# Patient Record
Sex: MALE | Race: BLACK OR AFRICAN AMERICAN | NOT HISPANIC OR LATINO | Employment: UNEMPLOYED | RURAL
[De-identification: names, ages, dates, MRNs, and addresses within clinical notes are randomized per-mention and may not be internally consistent; named-entity substitution may affect disease eponyms.]

---

## 2021-07-11 ENCOUNTER — OFFICE VISIT (OUTPATIENT)
Dept: FAMILY MEDICINE | Facility: CLINIC | Age: 31
End: 2021-07-11
Payer: MEDICARE

## 2021-07-11 VITALS
SYSTOLIC BLOOD PRESSURE: 179 MMHG | TEMPERATURE: 98 F | HEIGHT: 67 IN | BODY MASS INDEX: 40.81 KG/M2 | OXYGEN SATURATION: 98 % | DIASTOLIC BLOOD PRESSURE: 114 MMHG | RESPIRATION RATE: 18 BRPM | WEIGHT: 260 LBS | HEART RATE: 73 BPM

## 2021-07-11 DIAGNOSIS — R32 ENURESIS: ICD-10-CM

## 2021-07-11 DIAGNOSIS — R35.0 FREQUENT URINATION: ICD-10-CM

## 2021-07-11 DIAGNOSIS — N30.00 ACUTE CYSTITIS WITHOUT HEMATURIA: Primary | ICD-10-CM

## 2021-07-11 LAB
BILIRUB SERPL-MCNC: ABNORMAL MG/DL
BLOOD URINE, POC: ABNORMAL
COLOR, POC UA: YELLOW
GLUCOSE UR QL STRIP: ABNORMAL
KETONES UR QL STRIP: ABNORMAL
LEUKOCYTE ESTERASE URINE, POC: ABNORMAL
NITRITE, POC UA: ABNORMAL
PH, POC UA: 7
PROTEIN, POC: ABNORMAL
SPECIFIC GRAVITY, POC UA: 1.01
UROBILINOGEN, POC UA: 0.2

## 2021-07-11 PROCEDURE — 99051 MED SERV EVE/WKEND/HOLIDAY: CPT | Mod: ,,, | Performed by: FAMILY MEDICINE

## 2021-07-11 PROCEDURE — 99051 PR MEDICAL SERVICES, EVE/WKEND/HOLIDAY: ICD-10-PCS | Mod: ,,, | Performed by: FAMILY MEDICINE

## 2021-07-11 PROCEDURE — 99214 OFFICE O/P EST MOD 30 MIN: CPT | Mod: ,,, | Performed by: FAMILY MEDICINE

## 2021-07-11 PROCEDURE — 81003 URINALYSIS AUTO W/O SCOPE: CPT | Mod: RHCUB | Performed by: FAMILY MEDICINE

## 2021-07-11 PROCEDURE — 99214 PR OFFICE/OUTPT VISIT, EST, LEVL IV, 30-39 MIN: ICD-10-PCS | Mod: ,,, | Performed by: FAMILY MEDICINE

## 2021-07-11 RX ORDER — IRON,FM,PS/FOLIC/B,C18/L.CASEI 130-1.25MG
CAPSULE ORAL DAILY
COMMUNITY
End: 2021-12-01 | Stop reason: SDUPTHER

## 2021-07-11 RX ORDER — DIPHENHYDRAMINE HCL 25 MG
25 TABLET ORAL NIGHTLY PRN
COMMUNITY
End: 2023-09-20

## 2021-07-11 RX ORDER — CIPROFLOXACIN 500 MG/1
500 TABLET ORAL EVERY 12 HOURS
Qty: 14 TABLET | Refills: 0 | Status: SHIPPED | OUTPATIENT
Start: 2021-07-11 | End: 2021-07-18

## 2021-07-11 RX ORDER — PAROXETINE HYDROCHLORIDE 40 MG/1
40 TABLET, FILM COATED ORAL EVERY MORNING
COMMUNITY
End: 2022-06-02 | Stop reason: SDUPTHER

## 2021-07-11 RX ORDER — POTASSIUM CHLORIDE 20 MEQ/15ML
SOLUTION ORAL DAILY
COMMUNITY
End: 2021-12-01 | Stop reason: SDUPTHER

## 2021-07-11 RX ORDER — PANTOPRAZOLE SODIUM 40 MG/1
40 TABLET, DELAYED RELEASE ORAL DAILY
COMMUNITY
End: 2022-03-16 | Stop reason: SDUPTHER

## 2021-07-11 RX ORDER — LOSARTAN POTASSIUM 100 MG/1
100 TABLET ORAL DAILY
COMMUNITY
End: 2021-09-01 | Stop reason: HOSPADM

## 2021-07-11 RX ORDER — HYDROCHLOROTHIAZIDE 25 MG/1
25 TABLET ORAL DAILY
COMMUNITY
End: 2021-09-01

## 2021-07-11 RX ORDER — RISPERIDONE 3 MG/1
3 TABLET ORAL 2 TIMES DAILY
COMMUNITY
End: 2022-06-02 | Stop reason: SDUPTHER

## 2021-07-11 RX ORDER — METOPROLOL SUCCINATE 50 MG/1
50 TABLET, EXTENDED RELEASE ORAL DAILY
COMMUNITY
End: 2022-03-16 | Stop reason: SDUPTHER

## 2021-07-11 RX ORDER — ATORVASTATIN CALCIUM 10 MG/1
10 TABLET, FILM COATED ORAL DAILY
COMMUNITY
End: 2022-03-16 | Stop reason: SDUPTHER

## 2021-07-11 RX ORDER — METFORMIN HYDROCHLORIDE 500 MG/1
500 TABLET ORAL
COMMUNITY
End: 2022-03-16 | Stop reason: SDUPTHER

## 2021-07-22 ENCOUNTER — OFFICE VISIT (OUTPATIENT)
Dept: FAMILY MEDICINE | Facility: CLINIC | Age: 31
End: 2021-07-22
Payer: MEDICARE

## 2021-07-22 ENCOUNTER — APPOINTMENT (OUTPATIENT)
Dept: RADIOLOGY | Facility: CLINIC | Age: 31
End: 2021-07-22
Attending: NURSE PRACTITIONER
Payer: MEDICARE

## 2021-07-22 VITALS
WEIGHT: 270 LBS | HEIGHT: 67 IN | RESPIRATION RATE: 16 BRPM | SYSTOLIC BLOOD PRESSURE: 157 MMHG | HEART RATE: 105 BPM | BODY MASS INDEX: 42.38 KG/M2 | DIASTOLIC BLOOD PRESSURE: 100 MMHG

## 2021-07-22 DIAGNOSIS — M25.562 PAIN IN JOINT OF LEFT KNEE: ICD-10-CM

## 2021-07-22 DIAGNOSIS — M25.562 PAIN IN JOINT OF LEFT KNEE: Primary | ICD-10-CM

## 2021-07-22 PROCEDURE — 99213 PR OFFICE/OUTPT VISIT, EST, LEVL III, 20-29 MIN: ICD-10-PCS | Mod: ,,, | Performed by: NURSE PRACTITIONER

## 2021-07-22 PROCEDURE — 73560 XR KNEE 1 OR 2 VIEW LEFT: ICD-10-PCS | Mod: 26,LT,, | Performed by: RADIOLOGY

## 2021-07-22 PROCEDURE — 73560 X-RAY EXAM OF KNEE 1 OR 2: CPT | Mod: 26,LT,, | Performed by: RADIOLOGY

## 2021-07-22 PROCEDURE — 73560 X-RAY EXAM OF KNEE 1 OR 2: CPT | Mod: TC,RHCUB,FY,LT | Performed by: NURSE PRACTITIONER

## 2021-07-22 PROCEDURE — 99213 OFFICE O/P EST LOW 20 MIN: CPT | Mod: ,,, | Performed by: NURSE PRACTITIONER

## 2021-09-01 ENCOUNTER — OFFICE VISIT (OUTPATIENT)
Dept: FAMILY MEDICINE | Facility: CLINIC | Age: 31
End: 2021-09-01
Payer: MEDICARE

## 2021-09-01 VITALS
SYSTOLIC BLOOD PRESSURE: 144 MMHG | TEMPERATURE: 96 F | BODY MASS INDEX: 40.77 KG/M2 | DIASTOLIC BLOOD PRESSURE: 94 MMHG | HEART RATE: 71 BPM | RESPIRATION RATE: 19 BRPM | HEIGHT: 68 IN | WEIGHT: 269 LBS

## 2021-09-01 DIAGNOSIS — R73.9 HYPERGLYCEMIA: ICD-10-CM

## 2021-09-01 DIAGNOSIS — E78.49 OTHER HYPERLIPIDEMIA: ICD-10-CM

## 2021-09-01 DIAGNOSIS — D64.9 ANEMIA, UNSPECIFIED TYPE: ICD-10-CM

## 2021-09-01 DIAGNOSIS — I10 ESSENTIAL HYPERTENSION: Primary | ICD-10-CM

## 2021-09-01 DIAGNOSIS — M10.071 ACUTE IDIOPATHIC GOUT OF RIGHT FOOT: ICD-10-CM

## 2021-09-01 LAB
ALBUMIN SERPL BCP-MCNC: 3.7 G/DL (ref 3.5–5)
ALBUMIN/GLOB SERPL: 0.8 {RATIO}
ALP SERPL-CCNC: 96 U/L (ref 45–115)
ALT SERPL W P-5'-P-CCNC: 46 U/L (ref 16–61)
ANION GAP SERPL CALCULATED.3IONS-SCNC: 8 MMOL/L (ref 7–16)
AST SERPL W P-5'-P-CCNC: 35 U/L (ref 15–37)
BASOPHILS # BLD AUTO: 0.03 K/UL (ref 0–0.2)
BASOPHILS NFR BLD AUTO: 0.4 % (ref 0–1)
BILIRUB SERPL-MCNC: 0.4 MG/DL (ref 0–1.2)
BUN SERPL-MCNC: 13 MG/DL (ref 7–18)
BUN/CREAT SERPL: 11 (ref 6–20)
CALCIUM SERPL-MCNC: 9.6 MG/DL (ref 8.5–10.1)
CHLORIDE SERPL-SCNC: 98 MMOL/L (ref 98–107)
CHOLEST SERPL-MCNC: 130 MG/DL (ref 0–200)
CHOLEST/HDLC SERPL: 3 {RATIO}
CO2 SERPL-SCNC: 34 MMOL/L (ref 21–32)
CREAT SERPL-MCNC: 1.17 MG/DL (ref 0.7–1.3)
CREAT UR-MCNC: 107 MG/DL (ref 39–259)
DIFFERENTIAL METHOD BLD: ABNORMAL
EOSINOPHIL # BLD AUTO: 0.29 K/UL (ref 0–0.5)
EOSINOPHIL NFR BLD AUTO: 3.8 % (ref 1–4)
ERYTHROCYTE [DISTWIDTH] IN BLOOD BY AUTOMATED COUNT: 13 % (ref 11.5–14.5)
EST. AVERAGE GLUCOSE BLD GHB EST-MCNC: 107 MG/DL
GLOBULIN SER-MCNC: 4.9 G/DL (ref 2–4)
GLUCOSE SERPL-MCNC: 85 MG/DL (ref 74–106)
HBA1C MFR BLD HPLC: 5.8 % (ref 4.5–6.6)
HCT VFR BLD AUTO: 36.4 % (ref 40–54)
HDLC SERPL-MCNC: 43 MG/DL (ref 40–60)
HGB BLD-MCNC: 12.1 G/DL (ref 13.5–18)
IMM GRANULOCYTES # BLD AUTO: 0.02 K/UL (ref 0–0.04)
IMM GRANULOCYTES NFR BLD: 0.3 % (ref 0–0.4)
IRON SATN MFR SERPL: 15 % (ref 14–50)
IRON SERPL-MCNC: 46 ΜG/DL (ref 65–175)
LDLC SERPL CALC-MCNC: 73 MG/DL
LDLC/HDLC SERPL: 1.7 {RATIO}
LYMPHOCYTES # BLD AUTO: 2.08 K/UL (ref 1–4.8)
LYMPHOCYTES NFR BLD AUTO: 27.2 % (ref 27–41)
MCH RBC QN AUTO: 27.4 PG (ref 27–31)
MCHC RBC AUTO-ENTMCNC: 33.2 G/DL (ref 32–36)
MCV RBC AUTO: 82.4 FL (ref 80–96)
MICROALBUMIN UR-MCNC: 2.2 MG/DL (ref 0–2.8)
MICROALBUMIN/CREAT RATIO PNL UR: 20.6 MG/G (ref 0–30)
MONOCYTES # BLD AUTO: 0.54 K/UL (ref 0–0.8)
MONOCYTES NFR BLD AUTO: 7.1 % (ref 2–6)
MPC BLD CALC-MCNC: 9.8 FL (ref 9.4–12.4)
NEUTROPHILS # BLD AUTO: 4.68 K/UL (ref 1.8–7.7)
NEUTROPHILS NFR BLD AUTO: 61.2 % (ref 53–65)
NONHDLC SERPL-MCNC: 87 MG/DL
NRBC # BLD AUTO: 0 X10E3/UL
NRBC, AUTO (.00): 0 %
PLATELET # BLD AUTO: 361 K/UL (ref 150–400)
POTASSIUM SERPL-SCNC: 3.2 MMOL/L (ref 3.5–5.1)
PROT SERPL-MCNC: 8.6 G/DL (ref 6.4–8.2)
RBC # BLD AUTO: 4.42 M/UL (ref 4.6–6.2)
SODIUM SERPL-SCNC: 137 MMOL/L (ref 136–145)
TIBC SERPL-MCNC: 308 ΜG/DL (ref 250–450)
TRIGL SERPL-MCNC: 69 MG/DL (ref 35–150)
URATE SERPL-MCNC: 9.6 MG/DL (ref 3.5–7.2)
VLDLC SERPL-MCNC: 14 MG/DL
WBC # BLD AUTO: 7.64 K/UL (ref 4.5–11)

## 2021-09-01 PROCEDURE — 84550 ASSAY OF BLOOD/URIC ACID: CPT | Mod: ,,, | Performed by: CLINICAL MEDICAL LABORATORY

## 2021-09-01 PROCEDURE — 80061 LIPID PANEL: CPT | Mod: ,,, | Performed by: CLINICAL MEDICAL LABORATORY

## 2021-09-01 PROCEDURE — 80053 COMPREHEN METABOLIC PANEL: CPT | Mod: ,,, | Performed by: CLINICAL MEDICAL LABORATORY

## 2021-09-01 PROCEDURE — 83036 HEMOGLOBIN A1C: ICD-10-PCS | Mod: ,,, | Performed by: CLINICAL MEDICAL LABORATORY

## 2021-09-01 PROCEDURE — 83036 HEMOGLOBIN GLYCOSYLATED A1C: CPT | Mod: ,,, | Performed by: CLINICAL MEDICAL LABORATORY

## 2021-09-01 PROCEDURE — 82043 MICROALBUMIN / CREATININE RATIO URINE: ICD-10-PCS | Mod: ,,, | Performed by: CLINICAL MEDICAL LABORATORY

## 2021-09-01 PROCEDURE — 99214 OFFICE O/P EST MOD 30 MIN: CPT | Mod: ,,, | Performed by: NURSE PRACTITIONER

## 2021-09-01 PROCEDURE — 82570 ASSAY OF URINE CREATININE: CPT | Mod: ,,, | Performed by: CLINICAL MEDICAL LABORATORY

## 2021-09-01 PROCEDURE — 99214 PR OFFICE/OUTPT VISIT, EST, LEVL IV, 30-39 MIN: ICD-10-PCS | Mod: ,,, | Performed by: NURSE PRACTITIONER

## 2021-09-01 PROCEDURE — 83540 IRON AND TIBC: ICD-10-PCS | Mod: ,,, | Performed by: CLINICAL MEDICAL LABORATORY

## 2021-09-01 PROCEDURE — 82043 UR ALBUMIN QUANTITATIVE: CPT | Mod: ,,, | Performed by: CLINICAL MEDICAL LABORATORY

## 2021-09-01 PROCEDURE — 83550 IRON BINDING TEST: CPT | Mod: ,,, | Performed by: CLINICAL MEDICAL LABORATORY

## 2021-09-01 PROCEDURE — 85025 CBC WITH DIFFERENTIAL: ICD-10-PCS | Mod: ,,, | Performed by: CLINICAL MEDICAL LABORATORY

## 2021-09-01 PROCEDURE — 84550 URIC ACID: ICD-10-PCS | Mod: ,,, | Performed by: CLINICAL MEDICAL LABORATORY

## 2021-09-01 PROCEDURE — 80053 COMPREHENSIVE METABOLIC PANEL: ICD-10-PCS | Mod: ,,, | Performed by: CLINICAL MEDICAL LABORATORY

## 2021-09-01 PROCEDURE — 85025 COMPLETE CBC W/AUTO DIFF WBC: CPT | Mod: ,,, | Performed by: CLINICAL MEDICAL LABORATORY

## 2021-09-01 PROCEDURE — 83550 IRON AND TIBC: ICD-10-PCS | Mod: ,,, | Performed by: CLINICAL MEDICAL LABORATORY

## 2021-09-01 PROCEDURE — 82570 MICROALBUMIN / CREATININE RATIO URINE: ICD-10-PCS | Mod: ,,, | Performed by: CLINICAL MEDICAL LABORATORY

## 2021-09-01 PROCEDURE — 83540 ASSAY OF IRON: CPT | Mod: ,,, | Performed by: CLINICAL MEDICAL LABORATORY

## 2021-09-01 PROCEDURE — 80061 LIPID PANEL: ICD-10-PCS | Mod: ,,, | Performed by: CLINICAL MEDICAL LABORATORY

## 2021-09-01 RX ORDER — LOSARTAN POTASSIUM AND HYDROCHLOROTHIAZIDE 25; 100 MG/1; MG/1
1 TABLET ORAL DAILY
Qty: 90 TABLET | Refills: 3 | Status: SHIPPED | OUTPATIENT
Start: 2021-09-01 | End: 2022-06-02 | Stop reason: SDUPTHER

## 2021-09-01 RX ORDER — ALLOPURINOL 100 MG/1
100 TABLET ORAL DAILY
Qty: 90 TABLET | Refills: 1 | Status: SHIPPED | OUTPATIENT
Start: 2021-09-01 | End: 2021-12-01 | Stop reason: SDUPTHER

## 2021-12-01 ENCOUNTER — OFFICE VISIT (OUTPATIENT)
Dept: FAMILY MEDICINE | Facility: CLINIC | Age: 31
End: 2021-12-01
Payer: MEDICARE

## 2021-12-01 VITALS — SYSTOLIC BLOOD PRESSURE: 160 MMHG | DIASTOLIC BLOOD PRESSURE: 110 MMHG

## 2021-12-01 DIAGNOSIS — D64.9 ANEMIA, UNSPECIFIED TYPE: ICD-10-CM

## 2021-12-01 DIAGNOSIS — Z79.899 ENCOUNTER FOR LONG-TERM (CURRENT) USE OF OTHER MEDICATIONS: ICD-10-CM

## 2021-12-01 DIAGNOSIS — M10.071 ACUTE IDIOPATHIC GOUT OF RIGHT FOOT: ICD-10-CM

## 2021-12-01 DIAGNOSIS — I10 ESSENTIAL HYPERTENSION, MALIGNANT: Primary | ICD-10-CM

## 2021-12-01 DIAGNOSIS — E11.9 DIABETES MELLITUS WITHOUT COMPLICATION: ICD-10-CM

## 2021-12-01 PROCEDURE — 80061 LIPID PANEL: CPT | Mod: ,,, | Performed by: CLINICAL MEDICAL LABORATORY

## 2021-12-01 PROCEDURE — 84443 TSH: ICD-10-PCS | Mod: ,,, | Performed by: CLINICAL MEDICAL LABORATORY

## 2021-12-01 PROCEDURE — 80053 COMPREHENSIVE METABOLIC PANEL: ICD-10-PCS | Mod: ,,, | Performed by: CLINICAL MEDICAL LABORATORY

## 2021-12-01 PROCEDURE — 84146 ASSAY OF PROLACTIN: CPT | Mod: ,,, | Performed by: CLINICAL MEDICAL LABORATORY

## 2021-12-01 PROCEDURE — 80053 COMPREHEN METABOLIC PANEL: CPT | Mod: ,,, | Performed by: CLINICAL MEDICAL LABORATORY

## 2021-12-01 PROCEDURE — 80061 LIPID PANEL: ICD-10-PCS | Mod: ,,, | Performed by: CLINICAL MEDICAL LABORATORY

## 2021-12-01 PROCEDURE — 84146 PROLACTIN: ICD-10-PCS | Mod: ,,, | Performed by: CLINICAL MEDICAL LABORATORY

## 2021-12-01 PROCEDURE — 99213 OFFICE O/P EST LOW 20 MIN: CPT | Mod: ,,, | Performed by: NURSE PRACTITIONER

## 2021-12-01 PROCEDURE — 99213 PR OFFICE/OUTPT VISIT, EST, LEVL III, 20-29 MIN: ICD-10-PCS | Mod: ,,, | Performed by: NURSE PRACTITIONER

## 2021-12-01 PROCEDURE — 83036 HEMOGLOBIN GLYCOSYLATED A1C: CPT | Mod: ,,, | Performed by: CLINICAL MEDICAL LABORATORY

## 2021-12-01 PROCEDURE — 83036 HEMOGLOBIN A1C: ICD-10-PCS | Mod: ,,, | Performed by: CLINICAL MEDICAL LABORATORY

## 2021-12-01 PROCEDURE — 84443 ASSAY THYROID STIM HORMONE: CPT | Mod: ,,, | Performed by: CLINICAL MEDICAL LABORATORY

## 2021-12-01 RX ORDER — ALLOPURINOL 100 MG/1
100 TABLET ORAL DAILY
Qty: 90 TABLET | Refills: 1 | Status: SHIPPED | OUTPATIENT
Start: 2021-12-01 | End: 2022-03-16 | Stop reason: SDUPTHER

## 2021-12-01 RX ORDER — IRON,FM,PS/FOLIC/B,C18/L.CASEI 130-1.25MG
1 CAPSULE ORAL DAILY
Qty: 90 CAPSULE | Refills: 1 | Status: SHIPPED | OUTPATIENT
Start: 2021-12-01 | End: 2022-03-16 | Stop reason: SDUPTHER

## 2021-12-01 RX ORDER — POTASSIUM CHLORIDE 20 MEQ/15ML
20 SOLUTION ORAL DAILY
Qty: 473 ML | Refills: 1 | Status: SHIPPED | OUTPATIENT
Start: 2021-12-01 | End: 2022-03-28

## 2021-12-01 RX ORDER — AMLODIPINE BESYLATE 5 MG/1
5 TABLET ORAL DAILY
Qty: 90 TABLET | Refills: 1 | Status: SHIPPED | OUTPATIENT
Start: 2021-12-01 | End: 2022-03-16 | Stop reason: SDUPTHER

## 2021-12-02 LAB
ALBUMIN SERPL BCP-MCNC: 3.6 G/DL (ref 3.5–5)
ALBUMIN/GLOB SERPL: 0.7 {RATIO}
ALP SERPL-CCNC: 104 U/L (ref 45–115)
ALT SERPL W P-5'-P-CCNC: 50 U/L (ref 16–61)
ANION GAP SERPL CALCULATED.3IONS-SCNC: 11 MMOL/L (ref 7–16)
AST SERPL W P-5'-P-CCNC: 30 U/L (ref 15–37)
BILIRUB SERPL-MCNC: 0.5 MG/DL (ref 0–1.2)
BUN SERPL-MCNC: 11 MG/DL (ref 7–18)
BUN/CREAT SERPL: 8 (ref 6–20)
CALCIUM SERPL-MCNC: 9.2 MG/DL (ref 8.5–10.1)
CHLORIDE SERPL-SCNC: 98 MMOL/L (ref 98–107)
CHOLEST SERPL-MCNC: 123 MG/DL (ref 0–200)
CHOLEST/HDLC SERPL: 3 {RATIO}
CO2 SERPL-SCNC: 30 MMOL/L (ref 21–32)
CREAT SERPL-MCNC: 1.34 MG/DL (ref 0.7–1.3)
EST. AVERAGE GLUCOSE BLD GHB EST-MCNC: 110 MG/DL
GLOBULIN SER-MCNC: 5 G/DL (ref 2–4)
GLUCOSE SERPL-MCNC: 97 MG/DL (ref 74–106)
HBA1C MFR BLD HPLC: 5.9 % (ref 4.5–6.6)
HDLC SERPL-MCNC: 41 MG/DL (ref 40–60)
LDLC SERPL CALC-MCNC: 65 MG/DL
LDLC/HDLC SERPL: 1.6 {RATIO}
NONHDLC SERPL-MCNC: 82 MG/DL
POTASSIUM SERPL-SCNC: 3.2 MMOL/L (ref 3.5–5.1)
PROLACTIN SERPL-MCNC: 28.2 NG/ML
PROT SERPL-MCNC: 8.6 G/DL (ref 6.4–8.2)
SODIUM SERPL-SCNC: 136 MMOL/L (ref 136–145)
TRIGL SERPL-MCNC: 84 MG/DL (ref 35–150)
TSH SERPL DL<=0.005 MIU/L-ACNC: 0.94 UIU/ML (ref 0.36–3.74)
VLDLC SERPL-MCNC: 17 MG/DL

## 2021-12-22 ENCOUNTER — OFFICE VISIT (OUTPATIENT)
Dept: FAMILY MEDICINE | Facility: CLINIC | Age: 31
End: 2021-12-22
Payer: MEDICARE

## 2021-12-22 VITALS
HEIGHT: 68 IN | WEIGHT: 267.81 LBS | TEMPERATURE: 97 F | RESPIRATION RATE: 20 BRPM | SYSTOLIC BLOOD PRESSURE: 134 MMHG | BODY MASS INDEX: 40.59 KG/M2 | OXYGEN SATURATION: 98 % | DIASTOLIC BLOOD PRESSURE: 89 MMHG | HEART RATE: 69 BPM

## 2021-12-22 DIAGNOSIS — R19.7 DIARRHEA, UNSPECIFIED TYPE: ICD-10-CM

## 2021-12-22 DIAGNOSIS — A08.4 VIRAL GASTROENTERITIS: Primary | ICD-10-CM

## 2021-12-22 DIAGNOSIS — R50.9 FEVER, UNSPECIFIED FEVER CAUSE: ICD-10-CM

## 2021-12-22 DIAGNOSIS — R11.10 VOMITING, INTRACTABILITY OF VOMITING NOT SPECIFIED, PRESENCE OF NAUSEA NOT SPECIFIED, UNSPECIFIED VOMITING TYPE: ICD-10-CM

## 2021-12-22 PROCEDURE — 99213 OFFICE O/P EST LOW 20 MIN: CPT | Mod: ,,, | Performed by: NURSE PRACTITIONER

## 2021-12-22 PROCEDURE — 99213 PR OFFICE/OUTPT VISIT, EST, LEVL III, 20-29 MIN: ICD-10-PCS | Mod: ,,, | Performed by: NURSE PRACTITIONER

## 2021-12-22 RX ORDER — ONDANSETRON 4 MG/1
4 TABLET, ORALLY DISINTEGRATING ORAL EVERY 8 HOURS PRN
Qty: 10 TABLET | Refills: 0 | Status: SHIPPED | OUTPATIENT
Start: 2021-12-22 | End: 2022-03-16

## 2021-12-22 RX ORDER — DICYCLOMINE HYDROCHLORIDE 20 MG/1
20 TABLET ORAL EVERY 6 HOURS
Qty: 60 TABLET | Refills: 0 | Status: SHIPPED | OUTPATIENT
Start: 2021-12-22 | End: 2022-01-21

## 2022-01-10 ENCOUNTER — OFFICE VISIT (OUTPATIENT)
Dept: FAMILY MEDICINE | Facility: CLINIC | Age: 32
End: 2022-01-10
Payer: MEDICARE

## 2022-01-10 VITALS
DIASTOLIC BLOOD PRESSURE: 92 MMHG | OXYGEN SATURATION: 97 % | WEIGHT: 269.19 LBS | TEMPERATURE: 100 F | BODY MASS INDEX: 40.8 KG/M2 | SYSTOLIC BLOOD PRESSURE: 128 MMHG | HEART RATE: 85 BPM | HEIGHT: 68 IN

## 2022-01-10 DIAGNOSIS — J06.9 VIRAL UPPER RESPIRATORY TRACT INFECTION: Primary | ICD-10-CM

## 2022-01-10 DIAGNOSIS — R50.9 FEVER, UNSPECIFIED FEVER CAUSE: ICD-10-CM

## 2022-01-10 DIAGNOSIS — R05.9 COUGH: ICD-10-CM

## 2022-01-10 LAB
CTP QC/QA: YES
FLUAV AG NPH QL: NEGATIVE
FLUBV AG NPH QL: NEGATIVE
SARS-COV-2 AG RESP QL IA.RAPID: NEGATIVE

## 2022-01-10 PROCEDURE — 87428 SARSCOV & INF VIR A&B AG IA: CPT | Mod: RHCUB | Performed by: NURSE PRACTITIONER

## 2022-01-10 PROCEDURE — 99213 PR OFFICE/OUTPT VISIT, EST, LEVL III, 20-29 MIN: ICD-10-PCS | Mod: ,,, | Performed by: NURSE PRACTITIONER

## 2022-01-10 PROCEDURE — 99213 OFFICE O/P EST LOW 20 MIN: CPT | Mod: ,,, | Performed by: NURSE PRACTITIONER

## 2022-01-10 RX ORDER — AZITHROMYCIN 250 MG/1
TABLET, FILM COATED ORAL
Qty: 6 TABLET | Refills: 0 | Status: SHIPPED | OUTPATIENT
Start: 2022-01-10 | End: 2022-03-16

## 2022-01-10 NOTE — PROGRESS NOTES
Georgia Kc DNP   [address] [place] [address]     PATIENT NAME: Maldonado Levy  : 1990  DATE: 1/10/22  MRN: [id number]      Billing Provider: Georgia Kc DNP  Level of Service:   Patient PCP Information     Provider PCP Type    Georgia Kc DNP General          Reason for Visit / Chief Complaint: URI       Update PCP  Update Chief Complaint         History of Present Illness / Problem Focused Workflow     Maldonado Levy presents to the clinic with URI     HPI    Review of Systems     Review of Systems     Medical / Social / Family History   History reviewed. No pertinent past medical history.    History reviewed. No pertinent surgical history.    Social History    reports that he has never smoked. He has never used smokeless tobacco. He reports that he does not drink alcohol and does not use drugs.    Family History  's family history is not on file.    Medications and Allergies     Medications  No outpatient medications have been marked as taking for the 1/10/22 encounter (Office Visit) with Georgia Kc DNP.       Allergies  Review of patient's allergies indicates:  No Known Allergies    Physical Examination     Vitals:    01/10/22 1438   BP: (!) 128/92   Pulse: 85   Temp: 99.6 °F (37.6 °C)     Physical Exam  Vitals and nursing note reviewed.   HENT:      Head: Normocephalic.      Nose: Congestion and rhinorrhea present.      Mouth/Throat:      Mouth: Mucous membranes are moist.   Eyes:      Extraocular Movements: Extraocular movements intact.      Conjunctiva/sclera: Conjunctivae normal.      Pupils: Pupils are equal, round, and reactive to light.   Cardiovascular:      Rate and Rhythm: Normal rate and regular rhythm.      Pulses: Normal pulses.      Heart sounds: Normal heart sounds.   Pulmonary:      Effort: Pulmonary effort is normal.      Breath sounds: Normal breath sounds.   Musculoskeletal:      Cervical back: Normal range of motion.   Skin:      General: Skin is warm and dry.      Capillary Refill: Capillary refill takes less than 2 seconds.   Neurological:      General: No focal deficit present.      Mental Status: He is alert and oriented to person, place, and time. Mental status is at baseline.   Psychiatric:         Mood and Affect: Mood normal.         Behavior: Behavior normal.         Thought Content: Thought content normal.         Judgment: Judgment normal.          Assessment and Plan (including Health Maintenance)      Problem List  Smart Sets  Document Outside HM   :    Plan:         Health Maintenance Due   Topic Date Due    Hepatitis C Screening  Never done    COVID-19 Vaccine (1) Never done    HIV Screening  Never done    TETANUS VACCINE  Never done    Influenza Vaccine (1) Never done       Problem List Items Addressed This Visit        Other    Fever - Primary    Relevant Orders    POCT SARS-COV2 (COVID) with Flu Antigen (Completed)          The patient has no Health Maintenance topics of status Not Due    Future Appointments   Date Time Provider Department Center   3/2/2022  9:45 AM Georgia Kc DNP [facility]   3/9/2022  2:00 PM AWV NURSE, [facility] FAMILY MEDICINE [facility]            Signature:  Georgia Kc DNP      [address] [place] [address]    Date of encounter: 1/10/22

## 2022-03-11 DIAGNOSIS — Z71.89 COMPLEX CARE COORDINATION: ICD-10-CM

## 2022-03-16 ENCOUNTER — OFFICE VISIT (OUTPATIENT)
Dept: FAMILY MEDICINE | Facility: CLINIC | Age: 32
End: 2022-03-16
Payer: COMMERCIAL

## 2022-03-16 VITALS
OXYGEN SATURATION: 96 % | HEART RATE: 72 BPM | WEIGHT: 269.63 LBS | DIASTOLIC BLOOD PRESSURE: 90 MMHG | BODY MASS INDEX: 40.86 KG/M2 | HEIGHT: 68 IN | SYSTOLIC BLOOD PRESSURE: 137 MMHG

## 2022-03-16 DIAGNOSIS — E11.9 DIABETES MELLITUS WITHOUT COMPLICATION: ICD-10-CM

## 2022-03-16 DIAGNOSIS — E78.49 OTHER HYPERLIPIDEMIA: ICD-10-CM

## 2022-03-16 DIAGNOSIS — M10.071 ACUTE IDIOPATHIC GOUT OF RIGHT FOOT: ICD-10-CM

## 2022-03-16 DIAGNOSIS — Z11.59 SCREENING FOR VIRAL DISEASE: ICD-10-CM

## 2022-03-16 DIAGNOSIS — I10 ESSENTIAL HYPERTENSION: Primary | ICD-10-CM

## 2022-03-16 DIAGNOSIS — I10 ESSENTIAL HYPERTENSION, MALIGNANT: ICD-10-CM

## 2022-03-16 DIAGNOSIS — D64.9 ANEMIA, UNSPECIFIED TYPE: ICD-10-CM

## 2022-03-16 DIAGNOSIS — M10.9 GOUT, UNSPECIFIED CAUSE, UNSPECIFIED CHRONICITY, UNSPECIFIED SITE: ICD-10-CM

## 2022-03-16 LAB
ALBUMIN SERPL BCP-MCNC: 3.8 G/DL (ref 3.5–5)
ALBUMIN/GLOB SERPL: 0.9 {RATIO}
ALP SERPL-CCNC: 112 U/L (ref 45–115)
ALT SERPL W P-5'-P-CCNC: 41 U/L (ref 16–61)
ANION GAP SERPL CALCULATED.3IONS-SCNC: 7 MMOL/L (ref 7–16)
AST SERPL W P-5'-P-CCNC: 29 U/L (ref 15–37)
BASOPHILS # BLD AUTO: 0.03 K/UL (ref 0–0.2)
BASOPHILS NFR BLD AUTO: 0.4 % (ref 0–1)
BILIRUB SERPL-MCNC: 0.4 MG/DL (ref 0–1.2)
BUN SERPL-MCNC: 16 MG/DL (ref 7–18)
BUN/CREAT SERPL: 13 (ref 6–20)
CALCIUM SERPL-MCNC: 9.8 MG/DL (ref 8.5–10.1)
CHLORIDE SERPL-SCNC: 97 MMOL/L (ref 98–107)
CHOLEST SERPL-MCNC: 139 MG/DL (ref 0–200)
CHOLEST/HDLC SERPL: 3.6 {RATIO}
CO2 SERPL-SCNC: 34 MMOL/L (ref 21–32)
CREAT SERPL-MCNC: 1.28 MG/DL (ref 0.7–1.3)
DIFFERENTIAL METHOD BLD: ABNORMAL
EOSINOPHIL # BLD AUTO: 0.17 K/UL (ref 0–0.5)
EOSINOPHIL NFR BLD AUTO: 2.4 % (ref 1–4)
ERYTHROCYTE [DISTWIDTH] IN BLOOD BY AUTOMATED COUNT: 13.7 % (ref 11.5–14.5)
EST. AVERAGE GLUCOSE BLD GHB EST-MCNC: 100 MG/DL
GLOBULIN SER-MCNC: 4.4 G/DL (ref 2–4)
GLUCOSE SERPL-MCNC: 99 MG/DL (ref 74–106)
HBA1C MFR BLD HPLC: 5.6 % (ref 4.5–6.6)
HCT VFR BLD AUTO: 38.6 % (ref 40–54)
HCV AB SER QL: NORMAL
HDLC SERPL-MCNC: 39 MG/DL (ref 40–60)
HGB BLD-MCNC: 12.4 G/DL (ref 13.5–18)
HIV 1+O+2 AB SERPL QL: NORMAL
IMM GRANULOCYTES # BLD AUTO: 0.02 K/UL (ref 0–0.04)
IMM GRANULOCYTES NFR BLD: 0.3 % (ref 0–0.4)
IRON SATN MFR SERPL: 20 % (ref 14–50)
IRON SERPL-MCNC: 60 ΜG/DL (ref 65–175)
LDLC SERPL CALC-MCNC: 80 MG/DL
LDLC/HDLC SERPL: 2.1 {RATIO}
LYMPHOCYTES # BLD AUTO: 1.71 K/UL (ref 1–4.8)
LYMPHOCYTES NFR BLD AUTO: 24.5 % (ref 27–41)
MCH RBC QN AUTO: 27.8 PG (ref 27–31)
MCHC RBC AUTO-ENTMCNC: 32.1 G/DL (ref 32–36)
MCV RBC AUTO: 86.5 FL (ref 80–96)
MONOCYTES # BLD AUTO: 0.54 K/UL (ref 0–0.8)
MONOCYTES NFR BLD AUTO: 7.7 % (ref 2–6)
MPC BLD CALC-MCNC: 9.6 FL (ref 9.4–12.4)
NEUTROPHILS # BLD AUTO: 4.52 K/UL (ref 1.8–7.7)
NEUTROPHILS NFR BLD AUTO: 64.7 % (ref 53–65)
NONHDLC SERPL-MCNC: 100 MG/DL
NRBC # BLD AUTO: 0 X10E3/UL
NRBC, AUTO (.00): 0 %
PLATELET # BLD AUTO: 387 K/UL (ref 150–400)
POTASSIUM SERPL-SCNC: 3.4 MMOL/L (ref 3.5–5.1)
PROT SERPL-MCNC: 8.2 G/DL (ref 6.4–8.2)
RBC # BLD AUTO: 4.46 M/UL (ref 4.6–6.2)
SODIUM SERPL-SCNC: 135 MMOL/L (ref 136–145)
TIBC SERPL-MCNC: 300 ΜG/DL (ref 250–450)
TRIGL SERPL-MCNC: 100 MG/DL (ref 35–150)
URATE SERPL-MCNC: 7.8 MG/DL (ref 3.5–7.2)
VLDLC SERPL-MCNC: 20 MG/DL
WBC # BLD AUTO: 6.99 K/UL (ref 4.5–11)

## 2022-03-16 PROCEDURE — 99214 PR OFFICE/OUTPT VISIT, EST, LEVL IV, 30-39 MIN: ICD-10-PCS | Mod: ,,, | Performed by: NURSE PRACTITIONER

## 2022-03-16 PROCEDURE — 85025 CBC WITH DIFFERENTIAL: ICD-10-PCS | Mod: ,,, | Performed by: CLINICAL MEDICAL LABORATORY

## 2022-03-16 PROCEDURE — 85025 COMPLETE CBC W/AUTO DIFF WBC: CPT | Mod: ,,, | Performed by: CLINICAL MEDICAL LABORATORY

## 2022-03-16 PROCEDURE — 87389 HIV-1 AG W/HIV-1&-2 AB AG IA: CPT | Mod: ,,, | Performed by: CLINICAL MEDICAL LABORATORY

## 2022-03-16 PROCEDURE — 83550 IRON AND TIBC: ICD-10-PCS | Mod: ,,, | Performed by: CLINICAL MEDICAL LABORATORY

## 2022-03-16 PROCEDURE — 80061 LIPID PANEL: ICD-10-PCS | Mod: ,,, | Performed by: CLINICAL MEDICAL LABORATORY

## 2022-03-16 PROCEDURE — 83540 IRON AND TIBC: ICD-10-PCS | Mod: ,,, | Performed by: CLINICAL MEDICAL LABORATORY

## 2022-03-16 PROCEDURE — 86803 HEPATITIS C ANTIBODY: ICD-10-PCS | Mod: ,,, | Performed by: CLINICAL MEDICAL LABORATORY

## 2022-03-16 PROCEDURE — 84550 ASSAY OF BLOOD/URIC ACID: CPT | Mod: ,,, | Performed by: CLINICAL MEDICAL LABORATORY

## 2022-03-16 PROCEDURE — 86803 HEPATITIS C AB TEST: CPT | Mod: ,,, | Performed by: CLINICAL MEDICAL LABORATORY

## 2022-03-16 PROCEDURE — 83540 ASSAY OF IRON: CPT | Mod: ,,, | Performed by: CLINICAL MEDICAL LABORATORY

## 2022-03-16 PROCEDURE — 83550 IRON BINDING TEST: CPT | Mod: ,,, | Performed by: CLINICAL MEDICAL LABORATORY

## 2022-03-16 PROCEDURE — 83036 HEMOGLOBIN GLYCOSYLATED A1C: CPT | Mod: ,,, | Performed by: CLINICAL MEDICAL LABORATORY

## 2022-03-16 PROCEDURE — 80053 COMPREHENSIVE METABOLIC PANEL: ICD-10-PCS | Mod: ,,, | Performed by: CLINICAL MEDICAL LABORATORY

## 2022-03-16 PROCEDURE — 80053 COMPREHEN METABOLIC PANEL: CPT | Mod: ,,, | Performed by: CLINICAL MEDICAL LABORATORY

## 2022-03-16 PROCEDURE — 80061 LIPID PANEL: CPT | Mod: ,,, | Performed by: CLINICAL MEDICAL LABORATORY

## 2022-03-16 PROCEDURE — 99214 OFFICE O/P EST MOD 30 MIN: CPT | Mod: ,,, | Performed by: NURSE PRACTITIONER

## 2022-03-16 PROCEDURE — 87389 HIV 1 / 2 ANTIBODY: ICD-10-PCS | Mod: ,,, | Performed by: CLINICAL MEDICAL LABORATORY

## 2022-03-16 PROCEDURE — 84550 URIC ACID: ICD-10-PCS | Mod: ,,, | Performed by: CLINICAL MEDICAL LABORATORY

## 2022-03-16 PROCEDURE — 83036 HEMOGLOBIN A1C: ICD-10-PCS | Mod: ,,, | Performed by: CLINICAL MEDICAL LABORATORY

## 2022-03-16 RX ORDER — METFORMIN HYDROCHLORIDE 500 MG/1
500 TABLET ORAL
Qty: 90 TABLET | Refills: 1 | Status: SHIPPED | OUTPATIENT
Start: 2022-03-16 | End: 2022-06-02 | Stop reason: SDUPTHER

## 2022-03-16 RX ORDER — AMLODIPINE BESYLATE 10 MG/1
10 TABLET ORAL DAILY
Qty: 90 TABLET | Refills: 1 | Status: SHIPPED | OUTPATIENT
Start: 2022-03-16 | End: 2022-06-02 | Stop reason: SDUPTHER

## 2022-03-16 RX ORDER — AMLODIPINE BESYLATE 5 MG/1
5 TABLET ORAL DAILY
Qty: 90 TABLET | Refills: 1 | Status: SHIPPED | OUTPATIENT
Start: 2022-03-16 | End: 2022-03-16

## 2022-03-16 RX ORDER — PANTOPRAZOLE SODIUM 40 MG/1
40 TABLET, DELAYED RELEASE ORAL DAILY
Qty: 90 TABLET | Refills: 1 | Status: SHIPPED | OUTPATIENT
Start: 2022-03-16 | End: 2022-06-02 | Stop reason: SDUPTHER

## 2022-03-16 RX ORDER — ATORVASTATIN CALCIUM 10 MG/1
10 TABLET, FILM COATED ORAL DAILY
Qty: 90 TABLET | Refills: 1 | Status: SHIPPED | OUTPATIENT
Start: 2022-03-16 | End: 2022-06-02 | Stop reason: SDUPTHER

## 2022-03-16 RX ORDER — IRON,FM,PS/FOLIC/B,C18/L.CASEI 130-1.25MG
1 CAPSULE ORAL DAILY
Qty: 90 CAPSULE | Refills: 1 | Status: SHIPPED | OUTPATIENT
Start: 2022-03-16 | End: 2022-06-02 | Stop reason: SDUPTHER

## 2022-03-16 RX ORDER — METOPROLOL SUCCINATE 50 MG/1
50 TABLET, EXTENDED RELEASE ORAL DAILY
Qty: 90 TABLET | Refills: 1 | Status: SHIPPED | OUTPATIENT
Start: 2022-03-16 | End: 2022-06-02 | Stop reason: SDUPTHER

## 2022-03-16 RX ORDER — ALLOPURINOL 100 MG/1
100 TABLET ORAL DAILY
Qty: 90 TABLET | Refills: 1 | Status: SHIPPED | OUTPATIENT
Start: 2022-03-16 | End: 2022-06-02 | Stop reason: SDUPTHER

## 2022-03-16 NOTE — PROGRESS NOTES
Georgia Kc DNP   [address] [place] [address]     PATIENT NAME: Maldonado Levy  : 1990  DATE: 3/16/22  MRN: [id number]      Billing Provider: Georgia Kc DNP  Level of Service:   Patient PCP Information     Provider PCP Type    Georgia Kc DNP General          Reason for Visit / Chief Complaint: No chief complaint on file.       Update PCP  Update Chief Complaint         History of Present Illness / Problem Focused Workflow     Maldonado Levy presents to the clinic with No chief complaint on file.     Follow-up        Review of Systems     Review of Systems     Medical / Social / Family History   No past medical history on file.    No past surgical history on file.    Social History    reports that he has never smoked. He has never used smokeless tobacco. He reports that he does not drink alcohol and does not use drugs.    Family History  's family history is not on file.    Medications and Allergies     Medications  No outpatient medications have been marked as taking for the 3/16/22 encounter (Appointment) with Georgia Kc DNP.       Allergies  Review of patient's allergies indicates:  No Known Allergies    Physical Examination   There were no vitals filed for this visit.  Physical Exam  Vitals and nursing note reviewed.   Constitutional:       Appearance: Normal appearance. He is obese.   HENT:      Head: Normocephalic and atraumatic.      Nose: Nose normal.      Mouth/Throat:      Mouth: Mucous membranes are moist.   Eyes:      Pupils: Pupils are equal, round, and reactive to light.   Cardiovascular:      Rate and Rhythm: Normal rate and regular rhythm.      Pulses: Normal pulses.      Heart sounds: Normal heart sounds.   Pulmonary:      Effort: Pulmonary effort is normal.      Breath sounds: Normal breath sounds.   Abdominal:      General: Abdomen is flat. Bowel sounds are normal.      Palpations: Abdomen is soft.   Musculoskeletal:         General:  Normal range of motion.      Cervical back: Normal range of motion and neck supple.   Skin:     General: Skin is warm and dry.      Capillary Refill: Capillary refill takes less than 2 seconds.   Neurological:      General: No focal deficit present.      Mental Status: He is alert and oriented to person, place, and time. Mental status is at baseline.   Psychiatric:         Mood and Affect: Mood normal.         Behavior: Behavior normal.         Thought Content: Thought content normal.         Judgment: Judgment normal.          Assessment and Plan (including Health Maintenance)      Problem List  Smart Sets  Document Outside HM   :    Plan:       Continue current meds.. Labs today. Encouraged diet/exercise     Keep f/u with [facility]. 3/15/22 q3 months  bed wetting q2 months   otc fiber supplements for constipation  c'scopy in 11/2018.      Watch htn. Wgt up. Work on diet/exercise. goal 20lbs weight loss.     refill meds.  flu - 2021 at pharmacy  covid vaccine - 2021 [facility]      Continue b12 - left today without receiving.     bp up last several times.  See back 3 months .   Diabetic foot exam +2 pulses, dry and callused skin. Sees dr rendon in [place] [facility]. 1/2022 as needed  Send to [facility] for diabetic shoe eval. Never got - 2020 [place] had to pay $139 for them,      Health Maintenance Due   Topic Date Due    Hepatitis C Screening  Never done    HIV Screening  Never done    TETANUS VACCINE  Never done    COVID-19 Vaccine (2 - Booster for Zach series) 06/05/2021    Influenza Vaccine (1) Never done       Problem List Items Addressed This Visit    None         The patient has no Health Maintenance topics of status Not Due    Future Appointments   Date Time Provider Department Center   3/16/2022 10:00 AM Georgia Kc DNP [facility]   6/8/2022  3:00 PM AWV NURSE, [facility] FAMILY MEDICINE [facility]            Signature:  Georgia Kc DNP      [address]  [place] [address]    Date of encounter: 3/16/22

## 2022-03-17 NOTE — PROGRESS NOTES
Make sure he has potassium and iron daily - also his gout level big improvement since we started the allopurinol last visit.

## 2022-06-02 DIAGNOSIS — D64.9 ANEMIA, UNSPECIFIED TYPE: ICD-10-CM

## 2022-06-02 DIAGNOSIS — M10.071 ACUTE IDIOPATHIC GOUT OF RIGHT FOOT: ICD-10-CM

## 2022-06-02 DIAGNOSIS — I10 ESSENTIAL HYPERTENSION, MALIGNANT: ICD-10-CM

## 2022-06-02 RX ORDER — DIPHENHYDRAMINE HCL 25 MG
25 TABLET ORAL NIGHTLY PRN
OUTPATIENT
Start: 2022-06-02

## 2022-06-02 RX ORDER — RISPERIDONE 3 MG/1
3 TABLET ORAL 2 TIMES DAILY
Qty: 180 TABLET | Refills: 1 | Status: SHIPPED | OUTPATIENT
Start: 2022-06-02 | End: 2023-09-20 | Stop reason: SDUPTHER

## 2022-06-02 RX ORDER — METFORMIN HYDROCHLORIDE 500 MG/1
500 TABLET ORAL
Qty: 90 TABLET | Refills: 1 | Status: SHIPPED | OUTPATIENT
Start: 2022-06-02 | End: 2022-12-19 | Stop reason: SDUPTHER

## 2022-06-02 RX ORDER — LOSARTAN POTASSIUM AND HYDROCHLOROTHIAZIDE 25; 100 MG/1; MG/1
1 TABLET ORAL DAILY
Qty: 90 TABLET | Refills: 3 | Status: SHIPPED | OUTPATIENT
Start: 2022-06-02 | End: 2022-06-10 | Stop reason: SDUPTHER

## 2022-06-02 RX ORDER — ALLOPURINOL 100 MG/1
100 TABLET ORAL DAILY
Qty: 90 TABLET | Refills: 1 | Status: SHIPPED | OUTPATIENT
Start: 2022-06-02 | End: 2022-06-08 | Stop reason: SDUPTHER

## 2022-06-02 RX ORDER — ATORVASTATIN CALCIUM 10 MG/1
10 TABLET, FILM COATED ORAL DAILY
Qty: 90 TABLET | Refills: 1 | Status: SHIPPED | OUTPATIENT
Start: 2022-06-02 | End: 2022-06-22 | Stop reason: SDUPTHER

## 2022-06-02 RX ORDER — METOPROLOL SUCCINATE 50 MG/1
50 TABLET, EXTENDED RELEASE ORAL DAILY
Qty: 90 TABLET | Refills: 1 | Status: SHIPPED | OUTPATIENT
Start: 2022-06-02 | End: 2022-06-22 | Stop reason: SDUPTHER

## 2022-06-02 RX ORDER — PANTOPRAZOLE SODIUM 40 MG/1
40 TABLET, DELAYED RELEASE ORAL DAILY
Qty: 90 TABLET | Refills: 1 | Status: SHIPPED | OUTPATIENT
Start: 2022-06-02 | End: 2022-06-22 | Stop reason: SDUPTHER

## 2022-06-02 RX ORDER — POTASSIUM CHLORIDE 20 MEQ/15ML
SOLUTION ORAL
Qty: 473 ML | Refills: 1 | OUTPATIENT
Start: 2022-06-02

## 2022-06-02 RX ORDER — IRON,FM,PS/FOLIC/B,C18/L.CASEI 130-1.25MG
1 CAPSULE ORAL DAILY
Qty: 90 CAPSULE | Refills: 1 | Status: SHIPPED | OUTPATIENT
Start: 2022-06-02 | End: 2022-11-03 | Stop reason: SDUPTHER

## 2022-06-02 RX ORDER — PAROXETINE HYDROCHLORIDE 40 MG/1
40 TABLET, FILM COATED ORAL DAILY
Qty: 90 TABLET | Refills: 1 | Status: SHIPPED | OUTPATIENT
Start: 2022-06-02 | End: 2023-09-20 | Stop reason: SDUPTHER

## 2022-06-02 RX ORDER — AMLODIPINE BESYLATE 10 MG/1
10 TABLET ORAL DAILY
Qty: 90 TABLET | Refills: 1 | Status: SHIPPED | OUTPATIENT
Start: 2022-06-02 | End: 2022-11-28 | Stop reason: SDUPTHER

## 2022-06-02 NOTE — TELEPHONE ENCOUNTER
----- Message from Gladys Villafuerte sent at 6/2/2022 10:23 AM CDT -----  Regarding: REFILL  PATIENT CALL TO GET REFILL ON ALL OF HIS MEDICATION SENT TO [facility], CALL PATIENT  [phone]

## 2022-06-08 ENCOUNTER — OFFICE VISIT (OUTPATIENT)
Dept: FAMILY MEDICINE | Facility: CLINIC | Age: 32
End: 2022-06-08
Payer: COMMERCIAL

## 2022-06-08 VITALS
BODY MASS INDEX: 40.92 KG/M2 | OXYGEN SATURATION: 98 % | DIASTOLIC BLOOD PRESSURE: 80 MMHG | SYSTOLIC BLOOD PRESSURE: 122 MMHG | RESPIRATION RATE: 20 BRPM | WEIGHT: 270 LBS | HEIGHT: 68 IN | HEART RATE: 69 BPM

## 2022-06-08 DIAGNOSIS — E11.9 DIABETES MELLITUS WITHOUT COMPLICATION: ICD-10-CM

## 2022-06-08 DIAGNOSIS — E66.3 OVERWEIGHT: ICD-10-CM

## 2022-06-08 DIAGNOSIS — E78.49 OTHER HYPERLIPIDEMIA: ICD-10-CM

## 2022-06-08 DIAGNOSIS — Z00.00 ENCOUNTER FOR PREVENTIVE HEALTH EXAMINATION: Primary | ICD-10-CM

## 2022-06-08 DIAGNOSIS — I10 ESSENTIAL HYPERTENSION: ICD-10-CM

## 2022-06-08 DIAGNOSIS — Z13.31 POSITIVE DEPRESSION SCREENING: ICD-10-CM

## 2022-06-08 DIAGNOSIS — M10.071 ACUTE IDIOPATHIC GOUT OF RIGHT FOOT: ICD-10-CM

## 2022-06-08 DIAGNOSIS — N39.44 NOCTURNAL ENURESIS: ICD-10-CM

## 2022-06-08 DIAGNOSIS — R32 ENURESIS: ICD-10-CM

## 2022-06-08 DIAGNOSIS — M10.9 GOUT, UNSPECIFIED CAUSE, UNSPECIFIED CHRONICITY, UNSPECIFIED SITE: ICD-10-CM

## 2022-06-08 PROCEDURE — 3074F PR MOST RECENT SYSTOLIC BLOOD PRESSURE < 130 MM HG: ICD-10-PCS | Mod: ,,, | Performed by: NURSE PRACTITIONER

## 2022-06-08 PROCEDURE — 3044F HG A1C LEVEL LT 7.0%: CPT | Mod: ,,, | Performed by: NURSE PRACTITIONER

## 2022-06-08 PROCEDURE — 3008F PR BODY MASS INDEX (BMI) DOCUMENTED: ICD-10-PCS | Mod: ,,, | Performed by: NURSE PRACTITIONER

## 2022-06-08 PROCEDURE — G0439 PPPS, SUBSEQ VISIT: HCPCS | Mod: ,,, | Performed by: NURSE PRACTITIONER

## 2022-06-08 PROCEDURE — G0439 PR MEDICARE ANNUAL WELLNESS SUBSEQUENT VISIT: ICD-10-PCS | Mod: ,,, | Performed by: NURSE PRACTITIONER

## 2022-06-08 PROCEDURE — 3008F BODY MASS INDEX DOCD: CPT | Mod: ,,, | Performed by: NURSE PRACTITIONER

## 2022-06-08 PROCEDURE — 3079F PR MOST RECENT DIASTOLIC BLOOD PRESSURE 80-89 MM HG: ICD-10-PCS | Mod: ,,, | Performed by: NURSE PRACTITIONER

## 2022-06-08 PROCEDURE — 3079F DIAST BP 80-89 MM HG: CPT | Mod: ,,, | Performed by: NURSE PRACTITIONER

## 2022-06-08 PROCEDURE — 1159F MED LIST DOCD IN RCRD: CPT | Mod: ,,, | Performed by: NURSE PRACTITIONER

## 2022-06-08 PROCEDURE — 1159F PR MEDICATION LIST DOCUMENTED IN MEDICAL RECORD: ICD-10-PCS | Mod: ,,, | Performed by: NURSE PRACTITIONER

## 2022-06-08 PROCEDURE — 3044F PR MOST RECENT HEMOGLOBIN A1C LEVEL <7.0%: ICD-10-PCS | Mod: ,,, | Performed by: NURSE PRACTITIONER

## 2022-06-08 PROCEDURE — 3074F SYST BP LT 130 MM HG: CPT | Mod: ,,, | Performed by: NURSE PRACTITIONER

## 2022-06-08 RX ORDER — KETOROLAC TROMETHAMINE 10 MG/1
10 TABLET, FILM COATED ORAL EVERY 6 HOURS PRN
COMMUNITY
Start: 2022-05-23 | End: 2022-12-19

## 2022-06-08 RX ORDER — BUSPIRONE HYDROCHLORIDE 15 MG/1
15 TABLET ORAL 2 TIMES DAILY
COMMUNITY
Start: 2022-05-19 | End: 2022-06-22 | Stop reason: SDUPTHER

## 2022-06-08 RX ORDER — ALLOPURINOL 100 MG/1
100 TABLET ORAL DAILY
Qty: 90 TABLET | Refills: 1 | Status: SHIPPED | OUTPATIENT
Start: 2022-06-08 | End: 2022-06-22 | Stop reason: SDUPTHER

## 2022-06-08 NOTE — PATIENT INSTRUCTIONS
Counseling and Referral of Other Preventative  (Italic type indicates deductible and co-insurance are waived)    Patient Name: Maldonado Levy  Today's Date: 6/8/2022    Health Maintenance       Date Due Completion Date    Eye Exam Never done ---    COVID-19 Vaccine (2 - Booster for Zach series) 06/05/2021 4/10/2021    TETANUS VACCINE 03/16/2023 (Originally 7/13/2019) 7/13/2009    Influenza Vaccine (Season Ended) 09/01/2022 ---    Diabetes Urine Screening 09/01/2022 9/1/2021    Foot Exam 09/01/2022 9/1/2021    Hemoglobin A1c 09/16/2022 3/16/2022    Lipid Panel 03/16/2023 3/16/2022        No orders of the defined types were placed in this encounter.    The following information is provided to all patients.  This information is to help you find resources for any of the problems found today that may be affecting your health:                Living healthy guide: www.[facility].[place].[facility]      Understanding Diabetes: www.diabetes.org      Eating healthy: www.cdc.gov/healthyweight      CDC home safety checklist: www.cdc.gov/steadi/patient.html      Agency on Aging: www.goea.[place].[facility]      Alcoholics anonymous (AA): www.aa.org      Physical Activity: www.ryan.nih.gov/eq6necs      Tobacco use: www.quitwithusla.org

## 2022-06-08 NOTE — PROGRESS NOTES
[place] [facility]       PATIENT NAME: Maldonado Levy   : 1990    AGE: 32 y.o. DATE: 2022   MRN: [id number]        Reason for Visit / Chief Complaint: Medicare AWV Follow Up ([facility] WELLNESS SUBSEQUENT VISIT)        Maldonado Levy presents for an Subsequent Medicare AWV today.     The following components were reviewed and updated:    Medical/Social/Family History:  Past Medical History:   Diagnosis Date    Diabetes mellitus, type 2     Gout, unspecified     Hyperlipidemia     Hypertension         History reviewed. No pertinent family history.     Social History     Tobacco Use   Smoking Status Never Smoker   Smokeless Tobacco Never Used      Social History     Substance and Sexual Activity   Alcohol Use Never       History reviewed. No pertinent family history.    History reviewed. No pertinent surgical history.      · Allergies and Current Medications   Review of patient's allergies indicates:  No Known Allergies    Current Outpatient Medications:     amLODIPine (NORVASC) 10 MG tablet, Take 1 tablet (10 mg total) by mouth once daily., Disp: 90 tablet, Rfl: 1    atorvastatin (LIPITOR) 10 MG tablet, Take 1 tablet (10 mg total) by mouth once daily., Disp: 90 tablet, Rfl: 1    busPIRone (BUSPAR) 15 MG tablet, Take 15 mg by mouth 2 (two) times daily., Disp: , Rfl:     diphenhydrAMINE (SOMINEX) 25 mg tablet, Take 25 mg by mouth nightly as needed for Insomnia., Disp: , Rfl:     iron fum,ps-FA-vit B,C#18-Lact (FUSION PLUS) 130 mg iron -1,250 mcg Cap, Take 1 tablet by mouth once daily., Disp: 90 capsule, Rfl: 1    ketorolac (TORADOL) 10 mg tablet, Take 10 mg by mouth every 6 (six) hours as needed., Disp: , Rfl:     losartan-hydrochlorothiazide 100-25 mg (HYZAAR) 100-25 mg per tablet, Take 1 tablet by mouth once daily., Disp: 90 tablet, Rfl: 3    metFORMIN (GLUCOPHAGE) 500 MG tablet, Take 1 tablet (500 mg total) by mouth daily with  breakfast., Disp: 90 tablet, Rfl: 1    metoprolol succinate (TOPROL-XL) 50 MG 24 hr tablet, Take 1 tablet (50 mg total) by mouth once daily., Disp: 90 tablet, Rfl: 1    pantoprazole (PROTONIX) 40 MG tablet, Take 1 tablet (40 mg total) by mouth once daily., Disp: 90 tablet, Rfl: 1    paroxetine (PAXIL) 40 MG tablet, Take 1 tablet (40 mg total) by mouth once daily., Disp: 90 tablet, Rfl: 1    risperiDONE (RISPERDAL) 3 MG Tab, Take 1 tablet (3 mg total) by mouth 2 (two) times daily., Disp: 180 tablet, Rfl: 1    allopurinoL (ZYLOPRIM) 100 MG tablet, Take 1 tablet (100 mg total) by mouth once daily., Disp: 90 tablet, Rfl: 1    potassium chloride 10% (KAYCIEL) 20 mEq/15 mL oral solution, TAKE 1 TABLESPOON (15 MLS) BY MOUTH EVERY DAY . TAKE WITH FOOD (Patient not taking: Reported on 2022), Disp: 473 mL, Rfl: 1    · Health Risk Assessment   Fall Risk: No   Obesity: BMI 41.05     Advance Directive:  Does not have an advanced directive. Verbal education and written education included in today's AVS.    X Patient is interested in learning more about how to make advanced directives.  I provided them paperwork and offered to discuss this with them.   Depression: PHQ9 -3    HTN: 122/80   T2DM: A1C -5.9   Tobacco use: Denies tobacco use  STI: Not at risk    Alcohol misuse: Denies alcohol use Cage Score: N/A   Statin Use: Encouraged heart healthy diet & exercise   Mini Co      · Health Risk Assessment  What is your age?:  (30-35)  Are you male or female?: Male  During the past four weeks, how much have you been bothered by emotional problems such as feeling anxious, depressed, irritable, sad, or downhearted and blue?: Slightly  During the past five weeks, has your physical and/or emotional health limited your social activities with family, friends, neighbors, or groups?: Not at all  During the past four weeks, how much bodily pain have you generally had?: Mild pain  During the past four weeks, was someone available  to help if you needed and wanted help?: Yes, as much as I wanted  During the past four weeks, what was the hardest physical activity you could do for at least two minutes?: Heavy  Can you get to places out of walking distance without help?  (For example, can you travel alone on buses or taxis, or drive your own car?): Yes  Can you go shopping for groceries or clothes without someone's help?: No  Can you prepare your own meals?: Yes  Can you do your own housework without help?: Yes  Because of any health problems, do you need the help of another person with your personal care needs such as eating, bathing, dressing, or getting around the house?: No  Can you handle your own money without help?: Yes  During the past four weeks, how would you rate your health in general?: Good  How have things been going for you during the past four weeks?: Pretty well  Are you having difficulties driving your car?: Not applicable, I do not use a car  Do you always fasten your seat belt when you are in a car?: Yes, sometimes  How often in the past four weeks have you been bothered by falling or dizzy when standing up?: Never  How often in the past four weeks have you been bothered by sexual problems?: Never  How often in the past four weeks have you been bothered by trouble eating well?: Never  How often in the past four weeks have you been bothered by teeth or denture problems?: Sometimes  How often in the past four weeks have you been bothered with problems using the telephone?: Never  How often in the past four weeks have you been bothered by tiredness or fatigue?: Sometimes  Have you fallen two or more times in the past year?: No  Are you afraid of falling?: Yes  Are you a smoker?: No  During the past four weeks, how many drinks of wine, beer, or other alcoholic beverages did you have?: No alcohol at all  Do you exercise for about 20 minutes three or more days a week?: Yes, some of the time  Have you been given any information to  help you with hazards in your house that might hurt you?: Yes  Have you been given any information to help you with keeping track of your medications?: Yes  How often do you have trouble taking medicines the way you've been told to take them?: I always take them as prescribed  How confident are you that you can control and manage most of your health problems?: Somewhat confident  What is your race? (Check all that apply.):     · Health Maintenance   Last eye exam:  with [facility]   Last CV screen with lipids: 2022   Diabetes screening with fasting glucose or A1c: 2022   Colonoscopy: N/A   Flu Vaccine: Out of season   Pneumonia vaccines: N/A   COVID vaccine: 04/10/2021   Hep B vaccine: Not at risk   DEXA: N/A   Last pap/pelvic: N/A   Last Mammogram: N/A   Last PSA screen: N/A   AAA screening: N/A (once in lifetime for males 65-75 who have smoked > 100 cigarettes in lifetime)  HIV Screein2022- Nonreactive  Hepatitis C Screen: 2022- Nonreactive  Low Dose CT Scan: N/A    Health Maintenance Topics with due status: Not Due       Topic Last Completion Date    Diabetes Urine Screening 2021    Foot Exam 2021    Lipid Panel 2022    Hemoglobin A1c 2022    Influenza Vaccine Not Due     Health Maintenance Due   Topic Date Due    Eye Exam  Never done    COVID-19 Vaccine (2 - Booster for Zach series) 2021        Incontinence  Bowel: No  Bladder: No    Lab results available in Epic or see dates from [facility] above:   Lab Results   Component Value Date    CHOL 139 2022    CHOL 123 2021    CHOL 130 2021     Lab Results   Component Value Date    HDL 39 (L) 2022    HDL 41 2021    HDL 43 2021     Lab Results   Component Value Date    LDLCALC 80 2022    LDLCALC 65 2021    LDLCALC 73 2021     Lab Results   Component Value Date    TRIG 100 2022    TRIG 84 2021    TRIG 69 2021      Lab Results   Component Value Date    CHOLHDL 3.6 03/16/2022    CHOLHDL 3.0 12/01/2021    CHOLHDL 3.0 09/01/2021       Lab Results   Component Value Date    HGBA1C 5.6 03/16/2022       Sodium   Date Value Ref Range Status   03/16/2022 135 (L) 136 - 145 mmol/L Final     Potassium   Date Value Ref Range Status   03/16/2022 3.4 (L) 3.5 - 5.1 mmol/L Final     Chloride   Date Value Ref Range Status   03/16/2022 97 (L) 98 - 107 mmol/L Final     CO2   Date Value Ref Range Status   03/16/2022 34 (H) 21 - 32 mmol/L Final     Glucose   Date Value Ref Range Status   03/16/2022 99 74 - 106 mg/dL Final     BUN   Date Value Ref Range Status   03/16/2022 16 7 - 18 mg/dL Final     Creatinine   Date Value Ref Range Status   03/16/2022 1.28 0.70 - 1.30 mg/dL Final     Calcium   Date Value Ref Range Status   03/16/2022 9.8 8.5 - 10.1 mg/dL Final     Total Protein   Date Value Ref Range Status   03/16/2022 8.2 6.4 - 8.2 g/dL Final     Albumin   Date Value Ref Range Status   03/16/2022 3.8 3.5 - 5.0 g/dL Final     Bilirubin, Total   Date Value Ref Range Status   03/16/2022 0.4 0.0 - 1.2 mg/dL Final     Alk Phos   Date Value Ref Range Status   03/16/2022 112 45 - 115 U/L Final     AST   Date Value Ref Range Status   03/16/2022 29 15 - 37 U/L Final     ALT   Date Value Ref Range Status   03/16/2022 41 16 - 61 U/L Final     Anion Gap   Date Value Ref Range Status   03/16/2022 7 7 - 16 mmol/L Final     eGFR    Date Value Ref Range Status   12/01/2021 79 >=60 mL/min/1.73m² Final     eGFR   Date Value Ref Range Status   03/16/2022 69 >=60 mL/min/1.73m² Final       · Care Team   PCP: Georgia Kc DNP    [facility] specialist[facility]       **See Completed Assessments for Annual Wellness visit within the encounter summary    The following assessments were completed & reviewed:  · Depression Screening  · Cognitive function Screening  · Timed Get Up Test  · Whisper Test  · Vision Screen  · Health Risk  "Assessment  · Checklist of ADLs and IADLs      Objective  Vitals:    06/08/22 1453   BP: 122/80   Pulse: 69   Resp: 20   SpO2: 98%   Weight: 122.5 kg (270 lb)   Height: 5' 8" (1.727 m)   PainSc: 0-No pain      Body mass index is 41.05 kg/m².  Ideal body weight: 68.4 kg (150 lb 12.7 oz)       Physical Exam      Assessment:     1. Encounter for preventive health examination    2. Essential hypertension    3. Other hyperlipidemia    4. Gout, unspecified cause, unspecified chronicity, unspecified site    5. Diabetes mellitus without complication    6. Enuresis    7. BMI 40.0-44.9, adult    8. Positive depression screening    9. Overweight    10. Acute idiopathic gout of right foot  - allopurinoL (ZYLOPRIM) 100 MG tablet; Take 1 tablet (100 mg total) by mouth once daily.  Dispense: 90 tablet; Refill: 1         Plan:    Referrals/Orders:  Urology- nocturnal enuresis     Advised to call office if does not hear from anyone with referral appt within 2-3 weeks to check on status of referral. Voiced understanding.    Keep current on appts & continue medications as ordered. Encouraged diet & exercise to decrease weight/BMI.      Discussed and provided with a screening schedule and personal prevention plan in accordance with USPSTF age appropriate recommendations and Medicare screening guidelines.   Education, counseling, and referrals were provided as needed.  After Visit Summary printed and given to patient which includes written education and a list of any referrals if indicated. All education discussed with patient & handouts given to patient.      F/u plan for yearly AWV.      "

## 2022-06-22 ENCOUNTER — OFFICE VISIT (OUTPATIENT)
Dept: FAMILY MEDICINE | Facility: CLINIC | Age: 32
End: 2022-06-22
Payer: COMMERCIAL

## 2022-06-22 VITALS
OXYGEN SATURATION: 98 % | HEIGHT: 66 IN | SYSTOLIC BLOOD PRESSURE: 124 MMHG | HEART RATE: 97 BPM | DIASTOLIC BLOOD PRESSURE: 82 MMHG | BODY MASS INDEX: 43.77 KG/M2 | RESPIRATION RATE: 18 BRPM | WEIGHT: 272.38 LBS

## 2022-06-22 DIAGNOSIS — D50.8 IRON DEFICIENCY ANEMIA SECONDARY TO INADEQUATE DIETARY IRON INTAKE: ICD-10-CM

## 2022-06-22 DIAGNOSIS — Z79.899 OTHER LONG TERM (CURRENT) DRUG THERAPY: ICD-10-CM

## 2022-06-22 DIAGNOSIS — I10 ESSENTIAL HYPERTENSION: Primary | ICD-10-CM

## 2022-06-22 DIAGNOSIS — R73.03 PREDIABETES: ICD-10-CM

## 2022-06-22 DIAGNOSIS — F29 PSYCHOSIS, UNSPECIFIED PSYCHOSIS TYPE: ICD-10-CM

## 2022-06-22 DIAGNOSIS — M10.9 GOUT, UNSPECIFIED CAUSE, UNSPECIFIED CHRONICITY, UNSPECIFIED SITE: ICD-10-CM

## 2022-06-22 DIAGNOSIS — E78.49 OTHER HYPERLIPIDEMIA: ICD-10-CM

## 2022-06-22 DIAGNOSIS — M10.071 ACUTE IDIOPATHIC GOUT OF RIGHT FOOT: ICD-10-CM

## 2022-06-22 LAB
ALBUMIN SERPL BCP-MCNC: 3.9 G/DL (ref 3.5–5)
ALBUMIN/GLOB SERPL: 1 {RATIO}
ALP SERPL-CCNC: 106 U/L (ref 45–115)
ALT SERPL W P-5'-P-CCNC: 42 U/L (ref 16–61)
ANION GAP SERPL CALCULATED.3IONS-SCNC: 12 MMOL/L (ref 7–16)
AST SERPL W P-5'-P-CCNC: 32 U/L (ref 15–37)
BASOPHILS # BLD AUTO: 0.04 K/UL (ref 0–0.2)
BASOPHILS NFR BLD AUTO: 0.6 % (ref 0–1)
BILIRUB SERPL-MCNC: 0.4 MG/DL (ref 0–1.2)
BUN SERPL-MCNC: 18 MG/DL (ref 7–18)
BUN/CREAT SERPL: 14 (ref 6–20)
CALCIUM SERPL-MCNC: 9.3 MG/DL (ref 8.5–10.1)
CHLORIDE SERPL-SCNC: 98 MMOL/L (ref 98–107)
CHOLEST SERPL-MCNC: 135 MG/DL (ref 0–200)
CHOLEST/HDLC SERPL: 3.4 {RATIO}
CO2 SERPL-SCNC: 30 MMOL/L (ref 21–32)
CREAT SERPL-MCNC: 1.26 MG/DL (ref 0.7–1.3)
DIFFERENTIAL METHOD BLD: ABNORMAL
EOSINOPHIL # BLD AUTO: 0.26 K/UL (ref 0–0.5)
EOSINOPHIL NFR BLD AUTO: 3.6 % (ref 1–4)
ERYTHROCYTE [DISTWIDTH] IN BLOOD BY AUTOMATED COUNT: 13.4 % (ref 11.5–14.5)
EST. AVERAGE GLUCOSE BLD GHB EST-MCNC: 114 MG/DL
FOLATE SERPL-MCNC: >20 NG/ML (ref 3.1–17.5)
GLOBULIN SER-MCNC: 4.1 G/DL (ref 2–4)
GLUCOSE SERPL-MCNC: 73 MG/DL (ref 74–106)
HBA1C MFR BLD HPLC: 6 % (ref 4.5–6.6)
HCT VFR BLD AUTO: 36.6 % (ref 40–54)
HDLC SERPL-MCNC: 40 MG/DL (ref 40–60)
HGB BLD-MCNC: 11.9 G/DL (ref 13.5–18)
IMM GRANULOCYTES # BLD AUTO: 0.02 K/UL (ref 0–0.04)
IMM GRANULOCYTES NFR BLD: 0.3 % (ref 0–0.4)
IRON SATN MFR SERPL: 16 % (ref 14–50)
IRON SERPL-MCNC: 47 ΜG/DL (ref 65–175)
LDLC SERPL CALC-MCNC: 81 MG/DL
LDLC/HDLC SERPL: 2 {RATIO}
LYMPHOCYTES # BLD AUTO: 2.06 K/UL (ref 1–4.8)
LYMPHOCYTES NFR BLD AUTO: 28.4 % (ref 27–41)
MCH RBC QN AUTO: 27.8 PG (ref 27–31)
MCHC RBC AUTO-ENTMCNC: 32.5 G/DL (ref 32–36)
MCV RBC AUTO: 85.5 FL (ref 80–96)
MONOCYTES # BLD AUTO: 0.75 K/UL (ref 0–0.8)
MONOCYTES NFR BLD AUTO: 10.3 % (ref 2–6)
MPC BLD CALC-MCNC: 9.4 FL (ref 9.4–12.4)
NEUTROPHILS # BLD AUTO: 4.12 K/UL (ref 1.8–7.7)
NEUTROPHILS NFR BLD AUTO: 56.8 % (ref 53–65)
NONHDLC SERPL-MCNC: 95 MG/DL
NRBC # BLD AUTO: 0 X10E3/UL
NRBC, AUTO (.00): 0 %
PLATELET # BLD AUTO: 366 K/UL (ref 150–400)
POTASSIUM SERPL-SCNC: 2.9 MMOL/L (ref 3.5–5.1)
PROT SERPL-MCNC: 8 G/DL (ref 6.4–8.2)
RBC # BLD AUTO: 4.28 M/UL (ref 4.6–6.2)
SODIUM SERPL-SCNC: 137 MMOL/L (ref 136–145)
TIBC SERPL-MCNC: 302 ΜG/DL (ref 250–450)
TRIGL SERPL-MCNC: 72 MG/DL (ref 35–150)
URATE SERPL-MCNC: 8 MG/DL (ref 3.5–7.2)
VIT B12 SERPL-MCNC: 298 PG/ML (ref 193–986)
VLDLC SERPL-MCNC: 14 MG/DL
WBC # BLD AUTO: 7.25 K/UL (ref 4.5–11)

## 2022-06-22 PROCEDURE — 83550 IRON BINDING TEST: CPT | Mod: ,,, | Performed by: CLINICAL MEDICAL LABORATORY

## 2022-06-22 PROCEDURE — 99214 PR OFFICE/OUTPT VISIT, EST, LEVL IV, 30-39 MIN: ICD-10-PCS | Mod: ,,, | Performed by: NURSE PRACTITIONER

## 2022-06-22 PROCEDURE — 82607 VITAMIN B-12: CPT | Mod: ,,, | Performed by: CLINICAL MEDICAL LABORATORY

## 2022-06-22 PROCEDURE — 82306 VITAMIN D 25 HYDROXY: CPT | Mod: ,,, | Performed by: CLINICAL MEDICAL LABORATORY

## 2022-06-22 PROCEDURE — 3079F PR MOST RECENT DIASTOLIC BLOOD PRESSURE 80-89 MM HG: ICD-10-PCS | Mod: ,,, | Performed by: NURSE PRACTITIONER

## 2022-06-22 PROCEDURE — 82746 VITAMIN B12/FOLATE, SERUM PANEL: ICD-10-PCS | Mod: ,,, | Performed by: CLINICAL MEDICAL LABORATORY

## 2022-06-22 PROCEDURE — 85025 COMPLETE CBC W/AUTO DIFF WBC: CPT | Mod: ,,, | Performed by: CLINICAL MEDICAL LABORATORY

## 2022-06-22 PROCEDURE — 3044F HG A1C LEVEL LT 7.0%: CPT | Mod: ,,, | Performed by: NURSE PRACTITIONER

## 2022-06-22 PROCEDURE — 82746 ASSAY OF FOLIC ACID SERUM: CPT | Mod: ,,, | Performed by: CLINICAL MEDICAL LABORATORY

## 2022-06-22 PROCEDURE — 80061 LIPID PANEL: CPT | Mod: ,,, | Performed by: CLINICAL MEDICAL LABORATORY

## 2022-06-22 PROCEDURE — 3074F SYST BP LT 130 MM HG: CPT | Mod: ,,, | Performed by: NURSE PRACTITIONER

## 2022-06-22 PROCEDURE — 3074F PR MOST RECENT SYSTOLIC BLOOD PRESSURE < 130 MM HG: ICD-10-PCS | Mod: ,,, | Performed by: NURSE PRACTITIONER

## 2022-06-22 PROCEDURE — 82306 VITAMIN D: ICD-10-PCS | Mod: ,,, | Performed by: CLINICAL MEDICAL LABORATORY

## 2022-06-22 PROCEDURE — 84550 URIC ACID: ICD-10-PCS | Mod: ,,, | Performed by: CLINICAL MEDICAL LABORATORY

## 2022-06-22 PROCEDURE — 83540 IRON AND TIBC: ICD-10-PCS | Mod: ,,, | Performed by: CLINICAL MEDICAL LABORATORY

## 2022-06-22 PROCEDURE — 3079F DIAST BP 80-89 MM HG: CPT | Mod: ,,, | Performed by: NURSE PRACTITIONER

## 2022-06-22 PROCEDURE — 80053 COMPREHENSIVE METABOLIC PANEL: ICD-10-PCS | Mod: ,,, | Performed by: CLINICAL MEDICAL LABORATORY

## 2022-06-22 PROCEDURE — 80061 LIPID PANEL: ICD-10-PCS | Mod: ,,, | Performed by: CLINICAL MEDICAL LABORATORY

## 2022-06-22 PROCEDURE — 83550 IRON AND TIBC: ICD-10-PCS | Mod: ,,, | Performed by: CLINICAL MEDICAL LABORATORY

## 2022-06-22 PROCEDURE — 3044F PR MOST RECENT HEMOGLOBIN A1C LEVEL <7.0%: ICD-10-PCS | Mod: ,,, | Performed by: NURSE PRACTITIONER

## 2022-06-22 PROCEDURE — 83540 ASSAY OF IRON: CPT | Mod: ,,, | Performed by: CLINICAL MEDICAL LABORATORY

## 2022-06-22 PROCEDURE — 99214 OFFICE O/P EST MOD 30 MIN: CPT | Mod: ,,, | Performed by: NURSE PRACTITIONER

## 2022-06-22 PROCEDURE — 83036 HEMOGLOBIN A1C: ICD-10-PCS | Mod: ,,, | Performed by: CLINICAL MEDICAL LABORATORY

## 2022-06-22 PROCEDURE — 84550 ASSAY OF BLOOD/URIC ACID: CPT | Mod: ,,, | Performed by: CLINICAL MEDICAL LABORATORY

## 2022-06-22 PROCEDURE — 3008F PR BODY MASS INDEX (BMI) DOCUMENTED: ICD-10-PCS | Mod: ,,, | Performed by: NURSE PRACTITIONER

## 2022-06-22 PROCEDURE — 83036 HEMOGLOBIN GLYCOSYLATED A1C: CPT | Mod: ,,, | Performed by: CLINICAL MEDICAL LABORATORY

## 2022-06-22 PROCEDURE — 82607 VITAMIN B12/FOLATE, SERUM PANEL: ICD-10-PCS | Mod: ,,, | Performed by: CLINICAL MEDICAL LABORATORY

## 2022-06-22 PROCEDURE — 80053 COMPREHEN METABOLIC PANEL: CPT | Mod: ,,, | Performed by: CLINICAL MEDICAL LABORATORY

## 2022-06-22 PROCEDURE — 3008F BODY MASS INDEX DOCD: CPT | Mod: ,,, | Performed by: NURSE PRACTITIONER

## 2022-06-22 PROCEDURE — 85025 CBC WITH DIFFERENTIAL: ICD-10-PCS | Mod: ,,, | Performed by: CLINICAL MEDICAL LABORATORY

## 2022-06-22 RX ORDER — PANTOPRAZOLE SODIUM 40 MG/1
40 TABLET, DELAYED RELEASE ORAL DAILY
Qty: 90 TABLET | Refills: 1 | Status: SHIPPED | OUTPATIENT
Start: 2022-06-22 | End: 2022-12-06

## 2022-06-22 RX ORDER — ATORVASTATIN CALCIUM 10 MG/1
10 TABLET, FILM COATED ORAL DAILY
Qty: 90 TABLET | Refills: 1 | Status: SHIPPED | OUTPATIENT
Start: 2022-06-22 | End: 2022-11-30

## 2022-06-22 RX ORDER — ALLOPURINOL 100 MG/1
100 TABLET ORAL DAILY
Qty: 90 TABLET | Refills: 1 | Status: SHIPPED | OUTPATIENT
Start: 2022-06-22 | End: 2022-12-19 | Stop reason: SDUPTHER

## 2022-06-22 RX ORDER — BUSPIRONE HYDROCHLORIDE 15 MG/1
15 TABLET ORAL 2 TIMES DAILY
Qty: 180 TABLET | Refills: 1 | Status: SHIPPED | OUTPATIENT
Start: 2022-06-22 | End: 2023-03-09 | Stop reason: SDUPTHER

## 2022-06-22 RX ORDER — METOPROLOL SUCCINATE 50 MG/1
50 TABLET, EXTENDED RELEASE ORAL DAILY
Qty: 90 TABLET | Refills: 1 | Status: SHIPPED | OUTPATIENT
Start: 2022-06-22 | End: 2022-11-28 | Stop reason: SDUPTHER

## 2022-06-22 NOTE — PROGRESS NOTES
Georgia Kc DNP   [address] [place] [address]     PATIENT NAME: Maldonado Levy  : 1990  DATE: 22  MRN: [id number]      Billing Provider: [facility] Kc DNP  Level of Service:   Patient PCP Information     Provider PCP Type    [facility]ia Kc DNP General          Reason for Visit / Chief Complaint: follow up       Update PCP  Update Chief Complaint         History of Present Illness / Problem Focused Workflow     Maldonado Levy presents to the clinic with follow up    HPI    Review of Systems     Review of Systems     Medical / Social / Family History     Past Medical History:   Diagnosis Date    Diabetes mellitus, type 2     Gout, unspecified     Hyperlipidemia     Hypertension        No past surgical history on file.    Social History    reports that he has never smoked. He has never used smokeless tobacco. He reports that he does not drink alcohol and does not use drugs.    Family History  's family history is not on file.    Medications and Allergies     Medications  No outpatient medications have been marked as taking for the 22 encounter (Appointment) with [facility] Kc DNP.       Allergies  Review of patient's allergies indicates:  No Known Allergies    Physical Examination   There were no vitals filed for this visit.  Physical Exam  Vitals and nursing note reviewed.   Constitutional:       Appearance: Normal appearance. He is obese.   HENT:      Head: Normocephalic and atraumatic.      Nose: Nose normal.      Mouth/Throat:      Mouth: Mucous membranes are moist.   Eyes:      Pupils: Pupils are equal, round, and reactive to light.   Cardiovascular:      Rate and Rhythm: Normal rate and regular rhythm.      Pulses: Normal pulses.      Heart sounds: Normal heart sounds.   Pulmonary:      Effort: Pulmonary effort is normal.      Breath sounds: Normal breath sounds.   Abdominal:      General: Abdomen is flat. Bowel sounds are normal.      Palpations:  Abdomen is soft.   Musculoskeletal:         General: Normal range of motion.      Cervical back: Normal range of motion and neck supple.   Skin:     General: Skin is warm and dry.      Capillary Refill: Capillary refill takes less than 2 seconds.   Neurological:      General: No focal deficit present.      Mental Status: He is alert and oriented to person, place, and time. Mental status is at baseline.   Psychiatric:         Mood and Affect: Mood normal.         Behavior: Behavior normal.         Thought Content: Thought content normal.         Judgment: Judgment normal.          Assessment and Plan (including Health Maintenance)      Problem List  Smart Sets  Document Outside HM   :    Plan:   Continue current meds.. Labs today. Encouraged diet/exercise     Keep f/u with [facility]. 3/15/22 q3 months  bed wetting q2 months   otc fiber supplements for constipation  c'scopy in 11/2018.      Watch htn. Wgt up. Work on diet/exercise. goal 20lbs weight loss.     refill meds.  flu - 2021 at pharmacy  covid vaccine - 2021 J&J      See back 3 months .   Diabetic foot exam +2 pulses, dry and callused skin. Sees dr rendon in [place] podiatry. 1/2022 as needed  Send to [facility] for diabetic shoe eval. Never got - 2020 [place] had to pay $139 for them,     optho -       Health Maintenance Due   Topic Date Due    Eye Exam  Never done    COVID-19 Vaccine (2 - Booster for Zach series) 06/05/2021       Problem List Items Addressed This Visit    None         Health Maintenance Topics with due status: Not Due       Topic Last Completion Date    Diabetes Urine Screening 09/01/2021    Foot Exam 09/01/2021    Lipid Panel 03/16/2022    Hemoglobin A1c 03/16/2022    Influenza Vaccine Not Due       Future Appointments   Date Time Provider Department Center   6/22/2022  9:00 AM Georgia Kc DNP [facility]   6/13/2023  3:00 PM AWV NURSE, [facility]            Signature:   Georgia Kc, DNP      [address] [place] [address]    Date of encounter: 6/22/22

## 2022-06-23 LAB — 25(OH)D3 SERPL-MCNC: 34.5 NG/ML

## 2022-07-01 ENCOUNTER — CLINICAL SUPPORT (OUTPATIENT)
Dept: FAMILY MEDICINE | Facility: CLINIC | Age: 32
End: 2022-07-01
Payer: COMMERCIAL

## 2022-07-01 DIAGNOSIS — Z23 NEED FOR VACCINATION: Primary | ICD-10-CM

## 2022-07-01 PROCEDURE — 99499 UNLISTED E&M SERVICE: CPT | Mod: ,,, | Performed by: NURSE PRACTITIONER

## 2022-07-01 PROCEDURE — 0064A COVID-19, MRNA, LNP-S, PF, 100 MCG/0.25 ML DOSE VACCINE (MODERNA BOOSTER): ICD-10-PCS | Mod: ,,, | Performed by: NURSE PRACTITIONER

## 2022-07-01 PROCEDURE — 0064A COVID-19, MRNA, LNP-S, PF, 100 MCG/0.25 ML DOSE VACCINE (MODERNA BOOSTER): CPT | Mod: ,,, | Performed by: NURSE PRACTITIONER

## 2022-07-01 PROCEDURE — 91306 COVID-19, MRNA, LNP-S, PF, 100 MCG/0.25 ML DOSE VACCINE (MODERNA BOOSTER): ICD-10-PCS | Mod: ,,, | Performed by: NURSE PRACTITIONER

## 2022-07-01 PROCEDURE — 91306 COVID-19, MRNA, LNP-S, PF, 100 MCG/0.25 ML DOSE VACCINE (MODERNA BOOSTER): CPT | Mod: ,,, | Performed by: NURSE PRACTITIONER

## 2022-07-01 PROCEDURE — 99499 NO LOS: ICD-10-PCS | Mod: ,,, | Performed by: NURSE PRACTITIONER

## 2022-09-12 PROBLEM — Z00.00 ENCOUNTER FOR PREVENTIVE HEALTH EXAMINATION: Status: RESOLVED | Noted: 2022-06-08 | Resolved: 2022-09-12

## 2022-09-27 LAB
LEFT EYE DM RETINOPATHY: NORMAL
RIGHT EYE DM RETINOPATHY: NORMAL

## 2022-09-29 ENCOUNTER — PATIENT OUTREACH (OUTPATIENT)
Dept: FAMILY MEDICINE | Facility: CLINIC | Age: 32
End: 2022-09-29
Payer: MEDICARE

## 2022-11-01 PROBLEM — F29 PSYCHOSIS: Status: ACTIVE | Noted: 2022-11-01

## 2022-11-09 DIAGNOSIS — Z71.89 COMPLEX CARE COORDINATION: ICD-10-CM

## 2022-11-28 DIAGNOSIS — I10 ESSENTIAL HYPERTENSION, MALIGNANT: ICD-10-CM

## 2022-11-28 RX ORDER — AMLODIPINE BESYLATE 10 MG/1
10 TABLET ORAL DAILY
Qty: 90 TABLET | Refills: 1 | Status: SHIPPED | OUTPATIENT
Start: 2022-11-28 | End: 2023-02-02 | Stop reason: SDUPTHER

## 2022-11-28 RX ORDER — METOPROLOL SUCCINATE 50 MG/1
50 TABLET, EXTENDED RELEASE ORAL DAILY
Qty: 90 TABLET | Refills: 1 | Status: SHIPPED | OUTPATIENT
Start: 2022-11-28 | End: 2023-03-20 | Stop reason: SDUPTHER

## 2022-11-28 RX ORDER — LOSARTAN POTASSIUM AND HYDROCHLOROTHIAZIDE 25; 100 MG/1; MG/1
1 TABLET ORAL DAILY
Qty: 90 TABLET | Refills: 1 | Status: SHIPPED | OUTPATIENT
Start: 2022-11-28 | End: 2023-02-02 | Stop reason: SDUPTHER

## 2022-11-28 NOTE — TELEPHONE ENCOUNTER
----- Message from Gladys Villafuerte sent at 11/28/2022  1:30 PM CST -----  Regarding: REFILL  PATIENT CALLED TO GET A REFILL ON HIS BLOOD PRESSURE MEDICATION SENT TO [facility], CALL THE PATIENT  [phone]

## 2022-12-01 ENCOUNTER — OFFICE VISIT (OUTPATIENT)
Dept: FAMILY MEDICINE | Facility: CLINIC | Age: 32
End: 2022-12-01
Payer: MEDICARE

## 2022-12-01 VITALS
OXYGEN SATURATION: 97 % | SYSTOLIC BLOOD PRESSURE: 137 MMHG | WEIGHT: 275 LBS | HEIGHT: 68 IN | TEMPERATURE: 99 F | DIASTOLIC BLOOD PRESSURE: 90 MMHG | BODY MASS INDEX: 41.68 KG/M2 | HEART RATE: 74 BPM | RESPIRATION RATE: 18 BRPM

## 2022-12-01 DIAGNOSIS — J01.10 ACUTE NON-RECURRENT FRONTAL SINUSITIS: Primary | ICD-10-CM

## 2022-12-01 DIAGNOSIS — R50.9 FEVER, UNSPECIFIED FEVER CAUSE: ICD-10-CM

## 2022-12-01 PROCEDURE — 99213 OFFICE O/P EST LOW 20 MIN: CPT | Mod: ,,, | Performed by: NURSE PRACTITIONER

## 2022-12-01 PROCEDURE — 87428 SARSCOV & INF VIR A&B AG IA: CPT | Mod: RHCUB | Performed by: NURSE PRACTITIONER

## 2022-12-01 PROCEDURE — 99213 PR OFFICE/OUTPT VISIT, EST, LEVL III, 20-29 MIN: ICD-10-PCS | Mod: ,,, | Performed by: NURSE PRACTITIONER

## 2022-12-01 RX ORDER — HYDROXYZINE HYDROCHLORIDE 10 MG/5ML
10 SYRUP ORAL EVERY 8 HOURS PRN
Qty: 120 ML | Refills: 0 | Status: SHIPPED | OUTPATIENT
Start: 2022-12-01 | End: 2022-12-19 | Stop reason: ALTCHOICE

## 2022-12-01 RX ORDER — COLCHICINE 0.6 MG/1
0.6 TABLET ORAL
COMMUNITY
Start: 2022-08-11 | End: 2022-12-19 | Stop reason: SDUPTHER

## 2022-12-01 RX ORDER — METHYLPREDNISOLONE 4 MG/1
TABLET ORAL
Qty: 21 TABLET | Refills: 0 | Status: SHIPPED | OUTPATIENT
Start: 2022-12-01 | End: 2022-12-19

## 2022-12-01 RX ORDER — AZITHROMYCIN 250 MG/1
TABLET, FILM COATED ORAL
Qty: 6 TABLET | Refills: 0 | Status: SHIPPED | OUTPATIENT
Start: 2022-12-01 | End: 2022-12-19 | Stop reason: ALTCHOICE

## 2022-12-01 NOTE — LETTER
December 1, 2022      [facility]  [address] [address]  Phone: [phone]  Fax: [phone]       Patient: Maldonado eLvy   YOB: 1990  Date of Visit: 12/01/2022    To Whom It May Concern:    Yogesh Levy  was at [facility] on 12/01/2022. The patient may return to work/school on 12/05/2022 with no restrictions. He was negative for covid, influenza, strep. If you have any questions or concerns, or if I can be of further assistance, please do not hesitate to contact me.    Sincerely,    Radha Bergman NP

## 2022-12-01 NOTE — PROGRESS NOTES
Radha Bergman NP   [address] [place] [address]     PATIENT NAME: Maldonado Levy  : 1990  DATE: 22  MRN: [id number]      Billing Provider: Radha Bergman NP  Level of Service: MO OFFICE/OUTPT VISIT, EST, LEVL III, 20-29 MIN  Patient PCP Information       Provider PCP Type    [facility] K[facility] DNP General            Reason for Visit / Chief Complaint: Cough and Nasal Congestion       Update PCP  Update Chief Complaint         History of Present Illness / Problem Focused Workflow     Maldonado Levy presents to the clinic with Cough and Nasal Congestion     Cough  This is a new problem. The current episode started yesterday. The problem occurs constantly. The cough is Non-productive. Associated symptoms include a fever, myalgias, nasal congestion and postnasal drip. He has tried OTC cough suppressant for the symptoms. The treatment provided mild relief.     Review of Systems     Review of Systems   Constitutional:  Positive for fever.   HENT:  Positive for postnasal drip.    Respiratory:  Positive for cough.    Musculoskeletal:  Positive for myalgias.   All other systems reviewed and are negative.     Medical / Social / Family History     Past Medical History:   Diagnosis Date    Diabetes mellitus, type 2     Gout, unspecified     Hyperlipidemia     Hypertension        History reviewed. No pertinent surgical history.    Social History    reports that he has never smoked. He has never used smokeless tobacco. He reports that he does not drink alcohol and does not use drugs.    Family History  's family history is not on file.    Medications and Allergies     Medications  Outpatient Medications Marked as Taking for the 22 encounter (Office Visit) with Radha Bergman NP   Medication Sig Dispense Refill    allopurinoL (ZYLOPRIM) 100 MG tablet Take 1 tablet (100 mg total) by mouth once daily. 90 tablet 1    amLODIPine (NORVASC) 10 MG tablet Take  "1 tablet (10 mg total) by mouth once daily. 90 tablet 1    atorvastatin (LIPITOR) 10 MG tablet TAKE 1 TABLET (10 MG TOTAL) BY MOUTH ONCE DAILY. 30 tablet 1    busPIRone (BUSPAR) 15 MG tablet Take 1 tablet (15 mg total) by mouth 2 (two) times daily. 180 tablet 1    diphenhydrAMINE (SOMINEX) 25 mg tablet Take 25 mg by mouth nightly as needed for Insomnia.      FUSION PLUS 130 mg iron -1,250 mcg Cap TAKE 1 CAPSULE BY MOUTH EVERY DAY . TAKE WITH FOOD . TAKE WITH PLENTY OF WATER. 30 capsule 1    ketorolac (TORADOL) 10 mg tablet Take 10 mg by mouth every 6 (six) hours as needed.      losartan-hydrochlorothiazide 100-25 mg (HYZAAR) 100-25 mg per tablet Take 1 tablet by mouth once daily. 90 tablet 1    metFORMIN (GLUCOPHAGE) 500 MG tablet Take 1 tablet (500 mg total) by mouth daily with breakfast. 90 tablet 1    metoprolol succinate (TOPROL-XL) 50 MG 24 hr tablet Take 1 tablet (50 mg total) by mouth once daily. 90 tablet 1    pantoprazole (PROTONIX) 40 MG tablet Take 1 tablet (40 mg total) by mouth once daily. 90 tablet 1    paroxetine (PAXIL) 40 MG tablet Take 1 tablet (40 mg total) by mouth once daily. 90 tablet 1    potassium chloride 10% (KAYCIEL) 20 mEq/15 mL oral solution TAKE 1 TABLESPOON (15 MLS) BY MOUTH EVERY DAY . TAKE WITH FOOD 473 mL 1    risperiDONE (RISPERDAL) 3 MG Tab Take 1 tablet (3 mg total) by mouth 2 (two) times daily. 180 tablet 1       Allergies  Review of patient's allergies indicates:  No Known Allergies    Physical Examination   BP (!) 137/90 (BP Location: Left arm, Patient Position: Sitting, BP Method: Medium (Automatic))   Pulse 74   Temp 98.9 °F (37.2 °C) (Oral)   Resp 18   Ht 5' 8" (1.727 m)   Wt 124.7 kg (275 lb)   SpO2 97%   BMI 41.81 kg/m²    Physical Exam  Vitals and nursing note reviewed.   Constitutional:       Appearance: Normal appearance.   HENT:      Head: Normocephalic.      Right Ear: Tympanic membrane normal.      Left Ear: Tympanic membrane normal.      Nose: Congestion " present.      Mouth/Throat:      Mouth: Mucous membranes are moist.   Eyes:      Conjunctiva/sclera: Conjunctivae normal.      Pupils: Pupils are equal, round, and reactive to light.   Cardiovascular:      Rate and Rhythm: Normal rate and regular rhythm.      Pulses: Normal pulses.      Heart sounds: Normal heart sounds.   Pulmonary:      Effort: Pulmonary effort is normal.      Breath sounds: Normal breath sounds.   Abdominal:      General: Bowel sounds are normal.      Palpations: Abdomen is soft.   Musculoskeletal:         General: Normal range of motion.      Cervical back: Normal range of motion and neck supple.   Skin:     General: Skin is warm.      Capillary Refill: Capillary refill takes less than 2 seconds.   Neurological:      General: No focal deficit present.      Mental Status: He is alert and oriented to person, place, and time.   Psychiatric:         Mood and Affect: Mood normal.         Thought Content: Thought content normal.        Assessment and Plan (including Health Maintenance)      Problem List  Smart Sets  Document Outside HM   :    Plan:         Health Maintenance Due   Topic Date Due    COVID-19 Vaccine (3 - Booster for Zach series) 08/26/2022    Influenza Vaccine (1) Never done    Diabetes Urine Screening  09/01/2022    Foot Exam  09/01/2022       Problem List Items Addressed This Visit          Other    Fever    Relevant Orders    POCT SARS-COV2 (COVID) with Flu Antigen     Other Visit Diagnoses       Acute non-recurrent frontal sinusitis    -  Primary    Relevant Medications    azithromycin (Z-HANSA) 250 MG tablet    hydrOXYzine (ATARAX) 10 mg/5 mL syrup    methylPREDNISolone (MEDROL DOSEPACK) 4 mg tablet            Health Maintenance Topics with due status: Not Due       Topic Last Completion Date    Lipid Panel 06/22/2022    Hemoglobin A1c 06/22/2022    Eye Exam 09/27/2022       Future Appointments   Date Time Provider Department Center   12/22/2022  8:00 AM Georgia Kc DNP  [facility]   6/13/2023  3:00 PM AWV NURSE, [facility]ILY MEDICINE [facility]            Signature:  Radha Bergman NP      [address] [place] [address]    Date of encounter: 12/1/22

## 2022-12-19 ENCOUNTER — OFFICE VISIT (OUTPATIENT)
Dept: FAMILY MEDICINE | Facility: CLINIC | Age: 32
End: 2022-12-19
Payer: MEDICARE

## 2022-12-19 VITALS
WEIGHT: 279.38 LBS | RESPIRATION RATE: 99 BRPM | HEIGHT: 68 IN | BODY MASS INDEX: 42.34 KG/M2 | HEART RATE: 89 BPM | SYSTOLIC BLOOD PRESSURE: 173 MMHG | DIASTOLIC BLOOD PRESSURE: 100 MMHG

## 2022-12-19 DIAGNOSIS — D64.9 ANEMIA, UNSPECIFIED TYPE: ICD-10-CM

## 2022-12-19 DIAGNOSIS — R73.03 PREDIABETES: ICD-10-CM

## 2022-12-19 DIAGNOSIS — M10.071 ACUTE IDIOPATHIC GOUT OF RIGHT FOOT: ICD-10-CM

## 2022-12-19 DIAGNOSIS — M10.9 GOUT, UNSPECIFIED CAUSE, UNSPECIFIED CHRONICITY, UNSPECIFIED SITE: ICD-10-CM

## 2022-12-19 DIAGNOSIS — I10 ESSENTIAL HYPERTENSION: Primary | ICD-10-CM

## 2022-12-19 DIAGNOSIS — E78.49 OTHER HYPERLIPIDEMIA: ICD-10-CM

## 2022-12-19 LAB
ALBUMIN SERPL BCP-MCNC: 3.7 G/DL (ref 3.5–5)
ALBUMIN/GLOB SERPL: 0.8 {RATIO}
ALP SERPL-CCNC: 116 U/L (ref 45–115)
ALT SERPL W P-5'-P-CCNC: 55 U/L (ref 16–61)
ANION GAP SERPL CALCULATED.3IONS-SCNC: 8 MMOL/L (ref 7–16)
AST SERPL W P-5'-P-CCNC: 45 U/L (ref 15–37)
BASOPHILS # BLD AUTO: 0.04 K/UL (ref 0–0.2)
BASOPHILS NFR BLD AUTO: 0.5 % (ref 0–1)
BILIRUB SERPL-MCNC: 0.4 MG/DL (ref ?–1.2)
BUN SERPL-MCNC: 12 MG/DL (ref 7–18)
BUN/CREAT SERPL: 9 (ref 6–20)
CALCIUM SERPL-MCNC: 9.1 MG/DL (ref 8.5–10.1)
CHLORIDE SERPL-SCNC: 99 MMOL/L (ref 98–107)
CHOLEST SERPL-MCNC: 152 MG/DL (ref 0–200)
CHOLEST/HDLC SERPL: 3.2 {RATIO}
CO2 SERPL-SCNC: 35 MMOL/L (ref 21–32)
CREAT SERPL-MCNC: 1.41 MG/DL (ref 0.7–1.3)
CREAT UR-MCNC: 37 MG/DL (ref 39–259)
DIFFERENTIAL METHOD BLD: ABNORMAL
EGFR (NO RACE VARIABLE) (RUSH/TITUS): 67 ML/MIN/1.73M²
EOSINOPHIL # BLD AUTO: 0.26 K/UL (ref 0–0.5)
EOSINOPHIL NFR BLD AUTO: 3.5 % (ref 1–4)
ERYTHROCYTE [DISTWIDTH] IN BLOOD BY AUTOMATED COUNT: 13.6 % (ref 11.5–14.5)
EST. AVERAGE GLUCOSE BLD GHB EST-MCNC: 114 MG/DL
GLOBULIN SER-MCNC: 4.4 G/DL (ref 2–4)
GLUCOSE SERPL-MCNC: 75 MG/DL (ref 74–106)
HBA1C MFR BLD HPLC: 6 % (ref 4.5–6.6)
HCT VFR BLD AUTO: 39.8 % (ref 40–54)
HDLC SERPL-MCNC: 48 MG/DL (ref 40–60)
HGB BLD-MCNC: 12.9 G/DL (ref 13.5–18)
IMM GRANULOCYTES # BLD AUTO: 0.02 K/UL (ref 0–0.04)
IMM GRANULOCYTES NFR BLD: 0.3 % (ref 0–0.4)
IRON SATN MFR SERPL: 21 % (ref 14–50)
IRON SERPL-MCNC: 61 ΜG/DL (ref 65–175)
LDLC SERPL CALC-MCNC: 90 MG/DL
LDLC/HDLC SERPL: 1.9 {RATIO}
LYMPHOCYTES # BLD AUTO: 2.1 K/UL (ref 1–4.8)
LYMPHOCYTES NFR BLD AUTO: 28.6 % (ref 27–41)
MCH RBC QN AUTO: 28 PG (ref 27–31)
MCHC RBC AUTO-ENTMCNC: 32.4 G/DL (ref 32–36)
MCV RBC AUTO: 86.5 FL (ref 80–96)
MICROALBUMIN UR-MCNC: 0.6 MG/DL (ref 0–2.8)
MICROALBUMIN/CREAT RATIO PNL UR: 16.2 MG/G (ref 0–30)
MONOCYTES # BLD AUTO: 0.76 K/UL (ref 0–0.8)
MONOCYTES NFR BLD AUTO: 10.4 % (ref 2–6)
MPC BLD CALC-MCNC: 8.9 FL (ref 9.4–12.4)
NEUTROPHILS # BLD AUTO: 4.16 K/UL (ref 1.8–7.7)
NEUTROPHILS NFR BLD AUTO: 56.7 % (ref 53–65)
NONHDLC SERPL-MCNC: 104 MG/DL
NRBC # BLD AUTO: 0 X10E3/UL
NRBC, AUTO (.00): 0 %
PLATELET # BLD AUTO: 390 K/UL (ref 150–400)
POTASSIUM SERPL-SCNC: 3.2 MMOL/L (ref 3.5–5.1)
PROT SERPL-MCNC: 8.1 G/DL (ref 6.4–8.2)
RBC # BLD AUTO: 4.6 M/UL (ref 4.6–6.2)
SODIUM SERPL-SCNC: 139 MMOL/L (ref 136–145)
TIBC SERPL-MCNC: 292 ΜG/DL (ref 250–450)
TRIGL SERPL-MCNC: 70 MG/DL (ref 35–150)
URATE SERPL-MCNC: 9.3 MG/DL (ref 3.5–7.2)
VLDLC SERPL-MCNC: 14 MG/DL
WBC # BLD AUTO: 7.34 K/UL (ref 4.5–11)

## 2022-12-19 PROCEDURE — 85025 COMPLETE CBC W/AUTO DIFF WBC: CPT | Mod: ,,, | Performed by: CLINICAL MEDICAL LABORATORY

## 2022-12-19 PROCEDURE — 85025 CBC WITH DIFFERENTIAL: ICD-10-PCS | Mod: ,,, | Performed by: CLINICAL MEDICAL LABORATORY

## 2022-12-19 PROCEDURE — G0009 ADMIN PNEUMOCOCCAL VACCINE: HCPCS | Mod: ,,, | Performed by: NURSE PRACTITIONER

## 2022-12-19 PROCEDURE — 84550 ASSAY OF BLOOD/URIC ACID: CPT | Mod: ,,, | Performed by: CLINICAL MEDICAL LABORATORY

## 2022-12-19 PROCEDURE — 83540 ASSAY OF IRON: CPT | Mod: ,,, | Performed by: CLINICAL MEDICAL LABORATORY

## 2022-12-19 PROCEDURE — 83036 HEMOGLOBIN GLYCOSYLATED A1C: CPT | Mod: ,,, | Performed by: CLINICAL MEDICAL LABORATORY

## 2022-12-19 PROCEDURE — 83550 IRON AND TIBC: ICD-10-PCS | Mod: ,,, | Performed by: CLINICAL MEDICAL LABORATORY

## 2022-12-19 PROCEDURE — 96372 THER/PROPH/DIAG INJ SC/IM: CPT | Mod: ,,, | Performed by: NURSE PRACTITIONER

## 2022-12-19 PROCEDURE — 82570 ASSAY OF URINE CREATININE: CPT | Mod: ,,, | Performed by: CLINICAL MEDICAL LABORATORY

## 2022-12-19 PROCEDURE — 80053 COMPREHENSIVE METABOLIC PANEL: ICD-10-PCS | Mod: ,,, | Performed by: CLINICAL MEDICAL LABORATORY

## 2022-12-19 PROCEDURE — 83550 IRON BINDING TEST: CPT | Mod: ,,, | Performed by: CLINICAL MEDICAL LABORATORY

## 2022-12-19 PROCEDURE — 80061 LIPID PANEL: ICD-10-PCS | Mod: ,,, | Performed by: CLINICAL MEDICAL LABORATORY

## 2022-12-19 PROCEDURE — 84550 URIC ACID: ICD-10-PCS | Mod: ,,, | Performed by: CLINICAL MEDICAL LABORATORY

## 2022-12-19 PROCEDURE — G0009 PR ADMIN PNEUMOCOCCAL VACCINE: ICD-10-PCS | Mod: ,,, | Performed by: NURSE PRACTITIONER

## 2022-12-19 PROCEDURE — 82043 UR ALBUMIN QUANTITATIVE: CPT | Mod: ,,, | Performed by: CLINICAL MEDICAL LABORATORY

## 2022-12-19 PROCEDURE — 90677 PCV20 VACCINE IM: CPT | Mod: ,,, | Performed by: NURSE PRACTITIONER

## 2022-12-19 PROCEDURE — 90677 PR PNEUMOCOCCAL CONJ VACCINE, 20 VALENT, IM: ICD-10-PCS | Mod: ,,, | Performed by: NURSE PRACTITIONER

## 2022-12-19 PROCEDURE — 82570 MICROALBUMIN / CREATININE RATIO URINE: ICD-10-PCS | Mod: ,,, | Performed by: CLINICAL MEDICAL LABORATORY

## 2022-12-19 PROCEDURE — 96372 PR INJECTION,THERAP/PROPH/DIAG2ST, IM OR SUBCUT: ICD-10-PCS | Mod: ,,, | Performed by: NURSE PRACTITIONER

## 2022-12-19 PROCEDURE — 99214 PR OFFICE/OUTPT VISIT, EST, LEVL IV, 30-39 MIN: ICD-10-PCS | Mod: ,,, | Performed by: NURSE PRACTITIONER

## 2022-12-19 PROCEDURE — 80061 LIPID PANEL: CPT | Mod: ,,, | Performed by: CLINICAL MEDICAL LABORATORY

## 2022-12-19 PROCEDURE — 82043 MICROALBUMIN / CREATININE RATIO URINE: ICD-10-PCS | Mod: ,,, | Performed by: CLINICAL MEDICAL LABORATORY

## 2022-12-19 PROCEDURE — 83540 IRON AND TIBC: ICD-10-PCS | Mod: ,,, | Performed by: CLINICAL MEDICAL LABORATORY

## 2022-12-19 PROCEDURE — 99214 OFFICE O/P EST MOD 30 MIN: CPT | Mod: ,,, | Performed by: NURSE PRACTITIONER

## 2022-12-19 PROCEDURE — 80053 COMPREHEN METABOLIC PANEL: CPT | Mod: ,,, | Performed by: CLINICAL MEDICAL LABORATORY

## 2022-12-19 PROCEDURE — 83036 HEMOGLOBIN A1C: ICD-10-PCS | Mod: ,,, | Performed by: CLINICAL MEDICAL LABORATORY

## 2022-12-19 RX ORDER — COLCHICINE 0.6 MG/1
0.6 TABLET ORAL DAILY
Qty: 8 TABLET | Refills: 0 | Status: SHIPPED | OUTPATIENT
Start: 2022-12-19 | End: 2023-03-20

## 2022-12-19 RX ORDER — BETAMETHASONE SODIUM PHOSPHATE AND BETAMETHASONE ACETATE 3; 3 MG/ML; MG/ML
6 INJECTION, SUSPENSION INTRA-ARTICULAR; INTRALESIONAL; INTRAMUSCULAR; SOFT TISSUE
Status: COMPLETED | OUTPATIENT
Start: 2022-12-19 | End: 2022-12-19

## 2022-12-19 RX ORDER — METFORMIN HYDROCHLORIDE 500 MG/1
500 TABLET ORAL
Qty: 90 TABLET | Refills: 1 | Status: SHIPPED | OUTPATIENT
Start: 2022-12-19 | End: 2023-03-20 | Stop reason: SDUPTHER

## 2022-12-19 RX ORDER — ALLOPURINOL 100 MG/1
100 TABLET ORAL DAILY
Qty: 90 TABLET | Refills: 1 | Status: SHIPPED | OUTPATIENT
Start: 2022-12-19 | End: 2023-03-20 | Stop reason: SDUPTHER

## 2022-12-19 RX ORDER — KETOROLAC TROMETHAMINE 30 MG/ML
60 INJECTION, SOLUTION INTRAMUSCULAR; INTRAVENOUS
Status: COMPLETED | OUTPATIENT
Start: 2022-12-19 | End: 2022-12-19

## 2022-12-19 RX ORDER — DICLOFENAC SODIUM 50 MG/1
50 TABLET, DELAYED RELEASE ORAL 2 TIMES DAILY
Qty: 60 TABLET | Refills: 0 | Status: SHIPPED | OUTPATIENT
Start: 2022-12-19 | End: 2023-03-20 | Stop reason: SDUPTHER

## 2022-12-19 RX ORDER — IRON,FM,PS/FOLIC/B,C18/L.CASEI 130-1.25MG
CAPSULE ORAL
Qty: 30 CAPSULE | Refills: 1 | Status: SHIPPED | OUTPATIENT
Start: 2022-12-19 | End: 2023-03-07

## 2022-12-19 RX ADMIN — KETOROLAC TROMETHAMINE 60 MG: 30 INJECTION, SOLUTION INTRAMUSCULAR; INTRAVENOUS at 11:12

## 2022-12-19 RX ADMIN — BETAMETHASONE SODIUM PHOSPHATE AND BETAMETHASONE ACETATE 6 MG: 3; 3 INJECTION, SUSPENSION INTRA-ARTICULAR; INTRALESIONAL; INTRAMUSCULAR; SOFT TISSUE at 11:12

## 2022-12-19 NOTE — PROGRESS NOTES
Georgia Kc DNP   [address] [place] [address]     PATIENT NAME: Maldonado Levy  : 1990  DATE: 22  MRN: [id number]      Billing Provider: [facility] Kc DNP  Level of Service:   Patient PCP Information       Provider PCP Type    [facility] Kc DNP General            Reason for Visit / Chief Complaint: Follow-up, Hypertension, and Gout       Update PCP  Update Chief Complaint         History of Present Illness / Problem Focused Workflow     Maldonado Levy presents to the clinic with Follow-up, Hypertension, and Gout     Follow-up    Hypertension    Review of Systems     Review of Systems     Medical / Social / Family History     Past Medical History:   Diagnosis Date    Diabetes mellitus, type 2     Gout, unspecified     Hyperlipidemia     Hypertension        History reviewed. No pertinent surgical history.    Social History    reports that he has never smoked. He has never used smokeless tobacco. He reports that he does not drink alcohol and does not use drugs.    Family History  's family history is not on file.    Medications and Allergies     Medications  Outpatient Medications Marked as Taking for the 22 encounter (Office Visit) with [facility] Kc DNP   Medication Sig Dispense Refill    amLODIPine (NORVASC) 10 MG tablet Take 1 tablet (10 mg total) by mouth once daily. 90 tablet 1    atorvastatin (LIPITOR) 10 MG tablet TAKE 1 TABLET (10 MG TOTAL) BY MOUTH ONCE DAILY. 30 tablet 1    busPIRone (BUSPAR) 15 MG tablet Take 1 tablet (15 mg total) by mouth 2 (two) times daily. 180 tablet 1    diphenhydrAMINE (SOMINEX) 25 mg tablet Take 25 mg by mouth nightly as needed for Insomnia.      losartan-hydrochlorothiazide 100-25 mg (HYZAAR) 100-25 mg per tablet Take 1 tablet by mouth once daily. 90 tablet 1    metoprolol succinate (TOPROL-XL) 50 MG 24 hr tablet Take 1 tablet (50 mg total) by mouth once daily. 90 tablet 1    pantoprazole (PROTONIX) 40 MG tablet TAKE  "1 TABLET (40 MG TOTAL) BY MOUTH ONCE DAILY. 30 tablet 1    paroxetine (PAXIL) 40 MG tablet Take 1 tablet (40 mg total) by mouth once daily. 90 tablet 1    risperiDONE (RISPERDAL) 3 MG Tab Take 1 tablet (3 mg total) by mouth 2 (two) times daily. 180 tablet 1    [DISCONTINUED] FUSION PLUS 130 mg iron -1,250 mcg Cap TAKE 1 CAPSULE BY MOUTH EVERY DAY . TAKE WITH FOOD . TAKE WITH PLENTY OF WATER. 30 capsule 1     Current Facility-Administered Medications for the 12/19/22 encounter (Office Visit) with [facility] Kc DNP   Medication Dose Route Frequency Provider Last Rate Last Admin    betamethasone acetate-betamethasone sodium phosphate injection 6 mg  6 mg Intramuscular 1 time in Clinic/HOD [facility] DNP        ketorolac injection 60 mg  60 mg Intramuscular 1 time in Clinic/HOD [facility] DNP        pneumoc 20-sunita conj-dip cr(PF) (PREVNAR-20 (PF)) injection Syrg 0.5 mL  0.5 mL Intramuscular vaccine x 1 dose [facility]c DNP           Allergies  Review of patient's allergies indicates:  No Known Allergies    Physical Examination   BP (!) 173/100   Pulse 89   Resp (!) 99   Ht 5' 8" (1.727 m)   Wt 126.7 kg (279 lb 6.4 oz)   BMI 42.48 kg/m²    Physical Exam  Vitals and nursing note reviewed.   Constitutional:       Appearance: Normal appearance. He is obese.   HENT:      Head: Normocephalic and atraumatic.      Nose: Nose normal.      Mouth/Throat:      Mouth: Mucous membranes are moist.   Eyes:      Pupils: Pupils are equal, round, and reactive to light.   Cardiovascular:      Rate and Rhythm: Normal rate and regular rhythm.      Pulses: Normal pulses.           Dorsalis pedis pulses are 2+ on the right side and 2+ on the left side.        Posterior tibial pulses are 2+ on the right side and 2+ on the left side.      Heart sounds: Normal heart sounds.   Pulmonary:      Effort: Pulmonary effort is normal.      Breath sounds: Normal breath sounds.   Abdominal:      General: Abdomen is flat. Bowel " sounds are normal.      Palpations: Abdomen is soft.   Musculoskeletal:         General: Swelling and tenderness present. Normal range of motion.      Cervical back: Normal range of motion and neck supple.      Comments: Rt great toe swelling warmth erythematous.    Feet:      Right foot:      Protective Sensation: 10 sites tested.  10 sites sensed.      Skin integrity: Callus and dry skin present.      Toenail Condition: Right toenails are abnormally thick and long. Fungal disease present.     Left foot:      Protective Sensation: 10 sites tested.  10 sites sensed.      Skin integrity: Callus and dry skin present.      Toenail Condition: Left toenails are abnormally thick and long. Fungal disease present.  Skin:     General: Skin is warm and dry.      Capillary Refill: Capillary refill takes less than 2 seconds.   Neurological:      General: No focal deficit present.      Mental Status: He is alert and oriented to person, place, and time. Mental status is at baseline.   Psychiatric:         Mood and Affect: Mood normal.         Behavior: Behavior normal.         Thought Content: Thought content normal.         Judgment: Judgment normal.        Assessment and Plan (including Health Maintenance)      Problem List  Smart Sets  Document Outside HM   :    Plan:     Continue current meds.. Labs today. Encouraged diet/exercise     Keep f/u with [facility]. 3/15/22 q3 months  bed wetting q2 months now better. - using pullups at bedtime occasionally.   otc fiber supplements for constipation  c'scopy in 11/2018.      Watch htn. Wgt up. Work on diet/exercise. goal 20lbs weight loss.     refill meds.  flu - 2022 at pharmacy  covid vaccine - 2021 J&J      See back 3 months .   Diabetic foot exam +2 pulses, dry and callused skin. Sees dr rendon in [place] podiatry. 1/2022 as needed  Send to [facility] for diabetic shoe eval. Never got - 2020 [place] had to pay $139 for them,     optho - [facility] 9/2022  Pcv today - 12/2022    Health  Maintenance Due   Topic Date Due    COVID-19 Vaccine (3 - Booster for Zach series) 08/26/2022    Diabetes Urine Screening  09/01/2022    Foot Exam  09/01/2022    Hemoglobin A1c  12/22/2022       Problem List Items Addressed This Visit          Cardiac/Vascular    Essential hypertension - Primary    Relevant Orders    CBC Auto Differential    Comprehensive Metabolic Panel    Microalbumin/Creatinine Ratio, Urine    Other hyperlipidemia    Relevant Orders    Lipid Panel       Oncology    Anemia    Relevant Medications    iron fum,ps-FA-vit B,C#18-Lact (FUSION PLUS) 130 mg iron -1,250 mcg Cap    Other Relevant Orders    Iron and TIBC       Endocrine    Prediabetes    Relevant Orders    CBC Auto Differential    Comprehensive Metabolic Panel    Hemoglobin A1C    Microalbumin/Creatinine Ratio, Urine       Orthopedic    Acute idiopathic gout of right foot    Relevant Medications    allopurinoL (ZYLOPRIM) 100 MG tablet    Gout    Relevant Orders    Uric Acid       Health Maintenance Topics with due status: Not Due       Topic Last Completion Date    Lipid Panel 06/22/2022    Eye Exam 09/27/2022       Future Appointments   Date Time Provider Department Center   6/13/2023  3:00 PM [facility]ANNA NURSEURMILA [facility] MED[facility]            Signature:  Georgia Kc, SCOTTY      [address] [place] [address]    Date of encounter: 12/19/22

## 2022-12-21 RX ORDER — POTASSIUM CHLORIDE 20 MEQ/15ML
20 SOLUTION ORAL DAILY
Qty: 1350 ML | Refills: 1 | Status: SHIPPED | OUTPATIENT
Start: 2022-12-21 | End: 2023-03-21

## 2022-12-21 NOTE — TELEPHONE ENCOUNTER
----- Message from Georgia Kc DNP sent at 12/20/2022  7:28 PM CST -----  Start back iron daily and potassium

## 2023-02-02 DIAGNOSIS — I10 ESSENTIAL HYPERTENSION, MALIGNANT: ICD-10-CM

## 2023-02-02 RX ORDER — LOSARTAN POTASSIUM AND HYDROCHLOROTHIAZIDE 25; 100 MG/1; MG/1
1 TABLET ORAL DAILY
Qty: 90 TABLET | Refills: 1 | Status: SHIPPED | OUTPATIENT
Start: 2023-02-02 | End: 2023-03-20 | Stop reason: SDUPTHER

## 2023-02-02 RX ORDER — PANTOPRAZOLE SODIUM 40 MG/1
40 TABLET, DELAYED RELEASE ORAL DAILY
Qty: 90 TABLET | Refills: 1 | Status: SHIPPED | OUTPATIENT
Start: 2023-02-02 | End: 2023-08-02 | Stop reason: SDUPTHER

## 2023-02-02 RX ORDER — AMLODIPINE BESYLATE 10 MG/1
10 TABLET ORAL DAILY
Qty: 90 TABLET | Refills: 1 | Status: SHIPPED | OUTPATIENT
Start: 2023-02-02 | End: 2023-03-20 | Stop reason: SDUPTHER

## 2023-02-02 RX ORDER — ATORVASTATIN CALCIUM 10 MG/1
10 TABLET, FILM COATED ORAL DAILY
Qty: 90 TABLET | Refills: 1 | Status: SHIPPED | OUTPATIENT
Start: 2023-02-02 | End: 2023-08-02 | Stop reason: SDUPTHER

## 2023-02-02 NOTE — TELEPHONE ENCOUNTER
----- Message from Gladys Villafuerte sent at 2/2/2023 11:24 AM CST -----  Regarding: REFILL  PATIENT'S MOTHER CALL TO GET REFILL ON HIS STOMACH,CHOLESTEROL AND BLOOD PRESSURE MEDICATION SENT TO [facility], CALL THE MOTHER  [phone]

## 2023-03-09 RX ORDER — BUSPIRONE HYDROCHLORIDE 15 MG/1
15 TABLET ORAL 2 TIMES DAILY
Qty: 180 TABLET | Refills: 1 | Status: SHIPPED | OUTPATIENT
Start: 2023-03-09 | End: 2023-09-20 | Stop reason: SDUPTHER

## 2023-03-20 ENCOUNTER — OFFICE VISIT (OUTPATIENT)
Dept: FAMILY MEDICINE | Facility: CLINIC | Age: 33
End: 2023-03-20
Payer: MEDICARE

## 2023-03-20 VITALS
WEIGHT: 289 LBS | SYSTOLIC BLOOD PRESSURE: 130 MMHG | HEART RATE: 77 BPM | DIASTOLIC BLOOD PRESSURE: 83 MMHG | BODY MASS INDEX: 43.8 KG/M2 | TEMPERATURE: 98 F | HEIGHT: 68 IN

## 2023-03-20 DIAGNOSIS — R73.03 PREDIABETES: ICD-10-CM

## 2023-03-20 DIAGNOSIS — E78.49 OTHER HYPERLIPIDEMIA: ICD-10-CM

## 2023-03-20 DIAGNOSIS — M10.071 ACUTE IDIOPATHIC GOUT OF RIGHT FOOT: ICD-10-CM

## 2023-03-20 DIAGNOSIS — E66.01 MORBID OBESITY: ICD-10-CM

## 2023-03-20 DIAGNOSIS — I10 ESSENTIAL HYPERTENSION, MALIGNANT: Primary | ICD-10-CM

## 2023-03-20 DIAGNOSIS — Z13.31 POSITIVE DEPRESSION SCREENING: ICD-10-CM

## 2023-03-20 DIAGNOSIS — D64.9 ANEMIA, UNSPECIFIED TYPE: ICD-10-CM

## 2023-03-20 LAB
ALBUMIN SERPL BCP-MCNC: 3.9 G/DL (ref 3.5–5)
ALBUMIN/GLOB SERPL: 0.9 {RATIO}
ALP SERPL-CCNC: 112 U/L (ref 45–115)
ALT SERPL W P-5'-P-CCNC: 66 U/L (ref 16–61)
ANION GAP SERPL CALCULATED.3IONS-SCNC: 8 MMOL/L (ref 7–16)
AST SERPL W P-5'-P-CCNC: 76 U/L (ref 15–37)
BASOPHILS # BLD AUTO: 0.03 K/UL (ref 0–0.2)
BASOPHILS NFR BLD AUTO: 0.4 % (ref 0–1)
BILIRUB SERPL-MCNC: 0.5 MG/DL (ref ?–1.2)
BUN SERPL-MCNC: 10 MG/DL (ref 7–18)
BUN/CREAT SERPL: 8 (ref 6–20)
CALCIUM SERPL-MCNC: 9.8 MG/DL (ref 8.5–10.1)
CHLORIDE SERPL-SCNC: 96 MMOL/L (ref 98–107)
CHOLEST SERPL-MCNC: 150 MG/DL (ref 0–200)
CHOLEST/HDLC SERPL: 3.5 {RATIO}
CO2 SERPL-SCNC: 36 MMOL/L (ref 21–32)
CREAT SERPL-MCNC: 1.26 MG/DL (ref 0.7–1.3)
DIFFERENTIAL METHOD BLD: ABNORMAL
EGFR (NO RACE VARIABLE) (RUSH/TITUS): 77 ML/MIN/1.73M²
EOSINOPHIL # BLD AUTO: 0.24 K/UL (ref 0–0.5)
EOSINOPHIL NFR BLD AUTO: 3.3 % (ref 1–4)
ERYTHROCYTE [DISTWIDTH] IN BLOOD BY AUTOMATED COUNT: 13.2 % (ref 11.5–14.5)
EST. AVERAGE GLUCOSE BLD GHB EST-MCNC: 110 MG/DL
GLOBULIN SER-MCNC: 4.4 G/DL (ref 2–4)
GLUCOSE SERPL-MCNC: 85 MG/DL (ref 74–106)
HBA1C MFR BLD HPLC: 5.9 % (ref 4.5–6.6)
HCT VFR BLD AUTO: 39.5 % (ref 40–54)
HDLC SERPL-MCNC: 43 MG/DL (ref 40–60)
HGB BLD-MCNC: 12.6 G/DL (ref 13.5–18)
IMM GRANULOCYTES # BLD AUTO: 0.04 K/UL (ref 0–0.04)
IMM GRANULOCYTES NFR BLD: 0.6 % (ref 0–0.4)
IRON SATN MFR SERPL: 19 % (ref 14–50)
IRON SERPL-MCNC: 61 ΜG/DL (ref 65–175)
LDLC SERPL CALC-MCNC: 94 MG/DL
LDLC/HDLC SERPL: 2.2 {RATIO}
LYMPHOCYTES # BLD AUTO: 1.71 K/UL (ref 1–4.8)
LYMPHOCYTES NFR BLD AUTO: 23.8 % (ref 27–41)
MCH RBC QN AUTO: 27.6 PG (ref 27–31)
MCHC RBC AUTO-ENTMCNC: 31.9 G/DL (ref 32–36)
MCV RBC AUTO: 86.4 FL (ref 80–96)
MONOCYTES # BLD AUTO: 0.68 K/UL (ref 0–0.8)
MONOCYTES NFR BLD AUTO: 9.4 % (ref 2–6)
MPC BLD CALC-MCNC: 9.3 FL (ref 9.4–12.4)
NEUTROPHILS # BLD AUTO: 4.5 K/UL (ref 1.8–7.7)
NEUTROPHILS NFR BLD AUTO: 62.5 % (ref 53–65)
NONHDLC SERPL-MCNC: 107 MG/DL
NRBC # BLD AUTO: 0 X10E3/UL
NRBC, AUTO (.00): 0 %
PLATELET # BLD AUTO: 384 K/UL (ref 150–400)
POTASSIUM SERPL-SCNC: 3.5 MMOL/L (ref 3.5–5.1)
PROT SERPL-MCNC: 8.3 G/DL (ref 6.4–8.2)
RBC # BLD AUTO: 4.57 M/UL (ref 4.6–6.2)
SODIUM SERPL-SCNC: 136 MMOL/L (ref 136–145)
TIBC SERPL-MCNC: 322 ΜG/DL (ref 250–450)
TRIGL SERPL-MCNC: 64 MG/DL (ref 35–150)
URATE SERPL-MCNC: 8.6 MG/DL (ref 3.5–7.2)
VLDLC SERPL-MCNC: 13 MG/DL
WBC # BLD AUTO: 7.2 K/UL (ref 4.5–11)

## 2023-03-20 PROCEDURE — 99214 OFFICE O/P EST MOD 30 MIN: CPT | Mod: ,,, | Performed by: NURSE PRACTITIONER

## 2023-03-20 PROCEDURE — 83540 ASSAY OF IRON: CPT | Mod: ,,, | Performed by: CLINICAL MEDICAL LABORATORY

## 2023-03-20 PROCEDURE — 99214 PR OFFICE/OUTPT VISIT, EST, LEVL IV, 30-39 MIN: ICD-10-PCS | Mod: ,,, | Performed by: NURSE PRACTITIONER

## 2023-03-20 PROCEDURE — 83036 HEMOGLOBIN GLYCOSYLATED A1C: CPT | Mod: ,,, | Performed by: CLINICAL MEDICAL LABORATORY

## 2023-03-20 PROCEDURE — 84550 ASSAY OF BLOOD/URIC ACID: CPT | Mod: ,,, | Performed by: CLINICAL MEDICAL LABORATORY

## 2023-03-20 PROCEDURE — 83550 IRON AND TIBC: ICD-10-PCS | Mod: ,,, | Performed by: CLINICAL MEDICAL LABORATORY

## 2023-03-20 PROCEDURE — 84550 URIC ACID: ICD-10-PCS | Mod: ,,, | Performed by: CLINICAL MEDICAL LABORATORY

## 2023-03-20 PROCEDURE — 80053 COMPREHENSIVE METABOLIC PANEL: ICD-10-PCS | Mod: ,,, | Performed by: CLINICAL MEDICAL LABORATORY

## 2023-03-20 PROCEDURE — 85025 CBC WITH DIFFERENTIAL: ICD-10-PCS | Mod: ,,, | Performed by: CLINICAL MEDICAL LABORATORY

## 2023-03-20 PROCEDURE — 83540 IRON AND TIBC: ICD-10-PCS | Mod: ,,, | Performed by: CLINICAL MEDICAL LABORATORY

## 2023-03-20 PROCEDURE — 80061 LIPID PANEL: CPT | Mod: ,,, | Performed by: CLINICAL MEDICAL LABORATORY

## 2023-03-20 PROCEDURE — 80053 COMPREHEN METABOLIC PANEL: CPT | Mod: ,,, | Performed by: CLINICAL MEDICAL LABORATORY

## 2023-03-20 PROCEDURE — 83036 HEMOGLOBIN A1C: ICD-10-PCS | Mod: ,,, | Performed by: CLINICAL MEDICAL LABORATORY

## 2023-03-20 PROCEDURE — 85025 COMPLETE CBC W/AUTO DIFF WBC: CPT | Mod: ,,, | Performed by: CLINICAL MEDICAL LABORATORY

## 2023-03-20 PROCEDURE — 83550 IRON BINDING TEST: CPT | Mod: ,,, | Performed by: CLINICAL MEDICAL LABORATORY

## 2023-03-20 PROCEDURE — 80061 LIPID PANEL: ICD-10-PCS | Mod: ,,, | Performed by: CLINICAL MEDICAL LABORATORY

## 2023-03-20 RX ORDER — ALLOPURINOL 100 MG/1
100 TABLET ORAL DAILY
Qty: 90 TABLET | Refills: 1 | Status: SHIPPED | OUTPATIENT
Start: 2023-03-20 | End: 2023-05-24 | Stop reason: SDUPTHER

## 2023-03-20 RX ORDER — LOSARTAN POTASSIUM AND HYDROCHLOROTHIAZIDE 25; 100 MG/1; MG/1
1 TABLET ORAL DAILY
Qty: 90 TABLET | Refills: 1 | Status: SHIPPED | OUTPATIENT
Start: 2023-03-20 | End: 2023-09-20 | Stop reason: SDUPTHER

## 2023-03-20 RX ORDER — DICLOFENAC SODIUM 50 MG/1
50 TABLET, DELAYED RELEASE ORAL 2 TIMES DAILY
Qty: 60 TABLET | Refills: 3 | Status: SHIPPED | OUTPATIENT
Start: 2023-03-20 | End: 2023-09-20

## 2023-03-20 RX ORDER — AMLODIPINE BESYLATE 10 MG/1
10 TABLET ORAL DAILY
Qty: 90 TABLET | Refills: 1 | Status: SHIPPED | OUTPATIENT
Start: 2023-03-20 | End: 2023-09-20 | Stop reason: SDUPTHER

## 2023-03-20 RX ORDER — METOPROLOL SUCCINATE 50 MG/1
50 TABLET, EXTENDED RELEASE ORAL DAILY
Qty: 90 TABLET | Refills: 1 | Status: SHIPPED | OUTPATIENT
Start: 2023-03-20 | End: 2023-09-20 | Stop reason: SDUPTHER

## 2023-03-20 RX ORDER — METFORMIN HYDROCHLORIDE 500 MG/1
500 TABLET ORAL
Qty: 90 TABLET | Refills: 1 | Status: SHIPPED | OUTPATIENT
Start: 2023-03-20 | End: 2023-09-20 | Stop reason: SDUPTHER

## 2023-03-20 RX ORDER — IRON,FM,PS/FOLIC/B,C18/L.CASEI 130-1.25MG
1 CAPSULE ORAL DAILY
Qty: 30 CAPSULE | Refills: 5 | Status: SHIPPED | OUTPATIENT
Start: 2023-03-20 | End: 2024-01-19 | Stop reason: SDUPTHER

## 2023-03-20 NOTE — PROGRESS NOTES
Georgia Kc DNP   [address] [place] [address]     PATIENT NAME: Maldonado Levy  : 1990  DATE: 3/20/23  MRN: [id number]      Billing Provider: Georgia Kc DNP  Level of Service:   Patient PCP Information       Provider PCP Type    Georgia Kc DNP General            Reason for Visit / Chief Complaint: Follow-up and Hypertension       Update PCP  Update Chief Complaint         History of Present Illness / Problem Focused Workflow     Maldonado Levy presents to the clinic with Follow-up and Hypertension     HPI    Review of Systems     Review of Systems     Medical / Social / Family History     Past Medical History:   Diagnosis Date    Diabetes mellitus, type 2     Gout, unspecified     Hyperlipidemia     Hypertension        No past surgical history on file.    Social History    reports that he has never smoked. He has never used smokeless tobacco. He reports that he does not drink alcohol and does not use drugs.    Family History  's family history is not on file.    Medications and Allergies     Medications  Outpatient Medications Marked as Taking for the 3/20/23 encounter (Office Visit) with Georgia Kc DNP   Medication Sig Dispense Refill    allopurinoL (ZYLOPRIM) 100 MG tablet Take 1 tablet (100 mg total) by mouth once daily. 90 tablet 1    atorvastatin (LIPITOR) 10 MG tablet Take 1 tablet (10 mg total) by mouth once daily. 90 tablet 1    busPIRone (BUSPAR) 15 MG tablet Take 1 tablet (15 mg total) by mouth 2 (two) times daily. 180 tablet 1    diclofenac (VOLTAREN) 50 MG EC tablet Take 1 tablet (50 mg total) by mouth 2 (two) times daily. 60 tablet 0    FUSION PLUS 130 mg iron -1,250 mcg Cap TAKE 1 CAPSULE BY MOUTH EVERY DAY . TAKE WITH FOOD . TAKE WITH PLENTY OF WATER. 30 capsule 1    metFORMIN (GLUCOPHAGE) 500 MG tablet Take 1 tablet (500 mg total) by mouth daily with breakfast. 90 tablet 1    pantoprazole (PROTONIX) 40 MG tablet Take 1 tablet  "(40 mg total) by mouth once daily. 90 tablet 1    paroxetine (PAXIL) 40 MG tablet Take 1 tablet (40 mg total) by mouth once daily. 90 tablet 1       Allergies  Review of patient's allergies indicates:  No Known Allergies    Physical Examination   /83   Pulse 77   Temp 98.2 °F (36.8 °C)   Ht 5' 8" (1.727 m)   Wt 131.1 kg (289 lb)   BMI 43.94 kg/m²    Physical Exam  Vitals and nursing note reviewed.   Constitutional:       Appearance: Normal appearance. He is obese.   HENT:      Head: Normocephalic and atraumatic.      Nose: Nose normal.      Mouth/Throat:      Mouth: Mucous membranes are moist.   Eyes:      Pupils: Pupils are equal, round, and reactive to light.   Cardiovascular:      Rate and Rhythm: Normal rate and regular rhythm.      Pulses: Normal pulses.           Dorsalis pedis pulses are 2+ on the right side and 2+ on the left side.        Posterior tibial pulses are 2+ on the right side and 2+ on the left side.      Heart sounds: Normal heart sounds.   Pulmonary:      Effort: Pulmonary effort is normal.      Breath sounds: Normal breath sounds.   Abdominal:      General: Abdomen is flat. Bowel sounds are normal.      Palpations: Abdomen is soft.   Musculoskeletal:         General: Swelling and tenderness present. Normal range of motion.      Cervical back: Normal range of motion and neck supple.      Comments: Rt great toe swelling warmth erythematous.    Feet:      Right foot:      Protective Sensation: 10 sites tested.  10 sites sensed.      Skin integrity: Callus and dry skin present.      Toenail Condition: Right toenails are abnormally thick and long. Fungal disease present.     Left foot:      Protective Sensation: 10 sites tested.  10 sites sensed.      Skin integrity: Callus and dry skin present.      Toenail Condition: Left toenails are abnormally thick and long. Fungal disease present.  Skin:     General: Skin is warm and dry.      Capillary Refill: Capillary refill takes less than 2 " seconds.   Neurological:      General: No focal deficit present.      Mental Status: He is alert and oriented to person, place, and time. Mental status is at baseline.   Psychiatric:         Mood and Affect: Mood normal.         Behavior: Behavior normal.         Thought Content: Thought content normal.         Judgment: Judgment normal.        Assessment and Plan (including Health Maintenance)      Problem List  Smart Sets  Document Outside HM   :    Plan:     Continue current meds.. Labs today. Encouraged diet/exercise     Keep f/u with [facility]. 3/15/22 q3 months  bed wetting q2 months now better. - using pullups at bedtime occasionally.   otc fiber supplements for constipation  c'scopy in 11/2018.      Watch htn. Wgt up. Work on diet/exercise. goal 20lbs weight loss.     refill meds.  flu - 2022 at pharmacy  covid vaccine - 2021 [employer]      See back 3 months .   Diabetic foot exam +2 pulses, dry and callused skin. Sees dr rendon in [place] [facility]. 1/2022 as needed  Send to [facility] for diabetic shoe eval. Never got - 2020 [place] had to pay $139 for them,     optho - [facility] 9/2022  Pcv today - 12/2022       Health Maintenance Due   Topic Date Due    TETANUS VACCINE  07/13/2019    COVID-19 Vaccine (3 - Booster for Zach series) 08/26/2022       Problem List Items Addressed This Visit          Orthopedic    Acute idiopathic gout of right foot       Health Maintenance Topics with due status: Not Due       Topic Last Completion Date    Eye Exam 09/27/2022    Diabetes Urine Screening 12/19/2022    Foot Exam 12/19/2022    Lipid Panel 12/19/2022    Hemoglobin A1c 12/19/2022       Future Appointments   Date Time Provider Department Center   6/13/2023  3:00 PM AWV NURSE, [facility] LAMBERTO Cunningham            Signature:  Georgia Kc, SCOTTY      [address] [place] [address]    Date of encounter: 3/20/23

## 2023-05-24 DIAGNOSIS — M10.071 ACUTE IDIOPATHIC GOUT OF RIGHT FOOT: ICD-10-CM

## 2023-05-24 RX ORDER — ALLOPURINOL 100 MG/1
200 TABLET ORAL DAILY
Qty: 180 TABLET | Refills: 1 | OUTPATIENT
Start: 2023-05-24 | End: 2023-09-20 | Stop reason: SDUPTHER

## 2023-05-24 NOTE — TELEPHONE ENCOUNTER
----- Message from Georgia Kc DNP sent at 3/20/2023  8:03 PM CDT -----  Continue iron daily   Also would increase his allopurinol to 2 tabs daily

## 2023-06-09 DIAGNOSIS — Z71.89 COMPLEX CARE COORDINATION: ICD-10-CM

## 2023-06-13 ENCOUNTER — OFFICE VISIT (OUTPATIENT)
Dept: FAMILY MEDICINE | Facility: CLINIC | Age: 33
End: 2023-06-13
Payer: MEDICARE

## 2023-06-13 VITALS
RESPIRATION RATE: 21 BRPM | HEIGHT: 68 IN | BODY MASS INDEX: 43.19 KG/M2 | DIASTOLIC BLOOD PRESSURE: 73 MMHG | WEIGHT: 285 LBS | OXYGEN SATURATION: 97 % | HEART RATE: 73 BPM | SYSTOLIC BLOOD PRESSURE: 126 MMHG

## 2023-06-13 DIAGNOSIS — E78.49 OTHER HYPERLIPIDEMIA: ICD-10-CM

## 2023-06-13 DIAGNOSIS — Z00.00 ENCOUNTER FOR SUBSEQUENT ANNUAL WELLNESS VISIT (AWV) IN MEDICARE PATIENT: Primary | ICD-10-CM

## 2023-06-13 DIAGNOSIS — M10.9 GOUT, UNSPECIFIED CAUSE, UNSPECIFIED CHRONICITY, UNSPECIFIED SITE: ICD-10-CM

## 2023-06-13 DIAGNOSIS — R73.03 PREDIABETES: ICD-10-CM

## 2023-06-13 DIAGNOSIS — D64.9 ANEMIA, UNSPECIFIED TYPE: ICD-10-CM

## 2023-06-13 DIAGNOSIS — I10 ESSENTIAL HYPERTENSION, MALIGNANT: ICD-10-CM

## 2023-06-13 DIAGNOSIS — E66.01 MORBID OBESITY: ICD-10-CM

## 2023-06-13 PROBLEM — E11.9 DIABETES MELLITUS WITHOUT COMPLICATION: Status: RESOLVED | Noted: 2022-03-16 | Resolved: 2023-06-13

## 2023-06-13 PROCEDURE — G0439 PPPS, SUBSEQ VISIT: HCPCS | Mod: ,,, | Performed by: NURSE PRACTITIONER

## 2023-06-13 PROCEDURE — G0439 PR MEDICARE ANNUAL WELLNESS SUBSEQUENT VISIT: ICD-10-PCS | Mod: ,,, | Performed by: NURSE PRACTITIONER

## 2023-06-13 NOTE — PATIENT INSTRUCTIONS
Counseling and Referral of Other Preventative  (Italic type indicates deductible and co-insurance are waived)    Patient Name: Maldonado Levy  Today's Date: 6/13/2023    Health Maintenance       Date Due Completion Date    COVID-19 Vaccine (3 - Booster for Zach series) 08/26/2022 7/1/2022    Hemoglobin A1c 09/20/2023 3/20/2023    Eye Exam 09/27/2023 9/27/2022    Diabetes Urine Screening 12/19/2023 12/19/2022    Foot Exam 03/20/2024 3/20/2023    Lipid Panel 03/20/2024 3/20/2023    TETANUS VACCINE 03/27/2033 3/27/2023        No orders of the defined types were placed in this encounter.      The following information is provided to all patients.  This information is to help you find resources for any of the problems found today that may be affecting your health:                Living healthy guide: www.[facility].[place].[facility]      Understanding Diabetes: www.diabetes.org      Eating healthy: www.cdc.gov/healthyweight      [facility] home safety checklist: www.cdc.gov/steadi/patient.html      Agency on Aging: www.goea.[place].[facility]      Alcoholics anonymous (AA): www.aa.org      Physical Activity: www.ryan.nih.gov/dw3vllg      Tobacco use: www.quitwithusla.org

## 2023-06-13 NOTE — PROGRESS NOTES
[place] [facility] B[facility]       PATIENT NAME: Maldonado Levy   : 1990    AGE: 33 y.o. DATE: 2023   MRN: [id number]        Reason for Visit / Chief Complaint: Medicare AWV Follow Up (MEDICARE WELLNESS SUBSEQUENT VISIT)        Maldonado Levy presents for an Subsequent Medicare AWV today.     The following components were reviewed and updated:    Medical/Social/Family History:  Past Medical History:   Diagnosis Date    Diabetes mellitus, type 2     Gout, unspecified     Hyperlipidemia     Hypertension         History reviewed. No pertinent family history.     Social History     Tobacco Use   Smoking Status Never   Smokeless Tobacco Never      Social History     Substance and Sexual Activity   Alcohol Use Never       History reviewed. No pertinent family history.    History reviewed. No pertinent surgical history.      Allergies and Current Medications   Review of patient's allergies indicates:  No Known Allergies    Current Outpatient Medications:     allopurinoL (ZYLOPRIM) 100 MG tablet, Take 2 tablets (200 mg total) by mouth once daily., Disp: 180 tablet, Rfl: 1    amLODIPine (NORVASC) 10 MG tablet, Take 1 tablet (10 mg total) by mouth once daily., Disp: 90 tablet, Rfl: 1    atorvastatin (LIPITOR) 10 MG tablet, Take 1 tablet (10 mg total) by mouth once daily., Disp: 90 tablet, Rfl: 1    busPIRone (BUSPAR) 15 MG tablet, Take 1 tablet (15 mg total) by mouth 2 (two) times daily., Disp: 180 tablet, Rfl: 1    diclofenac (VOLTAREN) 50 MG EC tablet, Take 1 tablet (50 mg total) by mouth 2 (two) times daily., Disp: 60 tablet, Rfl: 3    diphenhydrAMINE (SOMINEX) 25 mg tablet, Take 25 mg by mouth nightly as needed for Insomnia., Disp: , Rfl:     iron fum,ps-FA-vit B,C#18-Lact (FUSION PLUS) 130 mg iron -1,250 mcg Cap, Take 1 capsule by mouth once daily., Disp: 30 capsule, Rfl: 5    losartan-hydrochlorothiazide 100-25 mg (HYZAAR) 100-25 mg per tablet, Take 1  tablet by mouth once daily., Disp: 90 tablet, Rfl: 1    metFORMIN (GLUCOPHAGE) 500 MG tablet, Take 1 tablet (500 mg total) by mouth daily with breakfast., Disp: 90 tablet, Rfl: 1    metoprolol succinate (TOPROL-XL) 50 MG 24 hr tablet, Take 1 tablet (50 mg total) by mouth once daily., Disp: 90 tablet, Rfl: 1    pantoprazole (PROTONIX) 40 MG tablet, Take 1 tablet (40 mg total) by mouth once daily., Disp: 90 tablet, Rfl: 1    paroxetine (PAXIL) 40 MG tablet, Take 1 tablet (40 mg total) by mouth once daily., Disp: 90 tablet, Rfl: 1    risperiDONE (RISPERDAL) 3 MG Tab, Take 1 tablet (3 mg total) by mouth 2 (two) times daily., Disp: 180 tablet, Rfl: 1    Health Risk Assessment   Fall Risk: No   Obesity: BMI 43.33     Advance Directive:  Does not have an advanced directive. Verbal education and written education included in today's AVS.    X Patient is interested in learning more about how to make advanced directives.  I provided them paperwork and offered to discuss this with them.   Depression: PHQ9 -2    HTN: 145/103;   126/73    T2DM: A1C- 5.9    Tobacco use: Denies tobacco use  STI: Not at risk    Alcohol misuse: Denies alcohol use Cage Score: N/A   Statin Use: Continue statin use. Encouraged heart healthy diet & exercise   Mini Co/5      Health Risk Assessment  What is your age?:  (30-35)  Are you male or female?: Male  During the past four weeks, how much have you been bothered by emotional problems such as feeling anxious, depressed, irritable, sad, or downhearted and blue?: Slightly  During the past five weeks, has your physical and/or emotional health limited your social activities with family, friends, neighbors, or groups?: Slightly  During the past four weeks, how much bodily pain have you generally had?: Mild pain  During the past four weeks, was someone available to help if you needed and wanted help?: Yes, as much as I wanted  During the past four weeks, what was the hardest physical activity you  could do for at least two minutes?: Moderate  Can you get to places out of walking distance without help?  (For example, can you travel alone on buses or taxis, or drive your own car?): Yes  Can you go shopping for groceries or clothes without someone's help?: No  Can you prepare your own meals?: Yes  Can you do your own housework without help?: Yes  Because of any health problems, do you need the help of another person with your personal care needs such as eating, bathing, dressing, or getting around the house?: No  Can you handle your own money without help?: No  During the past four weeks, how would you rate your health in general?: Good  How have things been going for you during the past four weeks?: Pretty well  Are you having difficulties driving your car?: Not applicable, I do not use a car  Do you always fasten your seat belt when you are in a car?: Yes, usually  How often in the past four weeks have you been bothered by falling or dizzy when standing up?: Never  How often in the past four weeks have you been bothered by sexual problems?: Never  How often in the past four weeks have you been bothered by trouble eating well?: Never  How often in the past four weeks have you been bothered by teeth or denture problems?: Never  How often in the past four weeks have you been bothered with problems using the telephone?: Never  How often in the past four weeks have you been bothered by tiredness or fatigue?: Sometimes  Have you fallen two or more times in the past year?: No  Are you afraid of falling?: Yes  Are you a smoker?: No  During the past four weeks, how many drinks of wine, beer, or other alcoholic beverages did you have?: No alcohol at all  Do you exercise for about 20 minutes three or more days a week?: No, I usually do not exercise this much  Have you been given any information to help you with hazards in your house that might hurt you?: Yes  Have you been given any information to help you with keeping  track of your medications?: Yes  How often do you have trouble taking medicines the way you've been told to take them?: I always take them as prescribed  How confident are you that you can control and manage most of your health problems?: Somewhat confident  What is your race? (Check all that apply.):     Opioid Risk Assessment:  Opioid risk assessment performed today. Patient is LOW risk for opoid abuse.     Opioid Risk Score         Value Time User    Opioid Risk Score  1 2023 10:00 AM Kika Marcelino RN                 Health Maintenance   Last eye exam: 2022- [facility]   Last CV screen with lipids: 2023   Diabetes screening with fasting glucose or A1c: 2023   Colonoscopy: N/A   Flu Vaccine: Out of season   Pneumonia vaccines: 2022   COVID vaccine: 04/10/2021; 2022   Hep B vaccine: Not at risk   DEXA: N/A   Last pap/pelvic: N/A   Last Mammogram: N/A   Last PSA screen: N/A   AAA screening: N/A (once in lifetime for males 65-75 who have smoked > 100 cigarettes in lifetime)  HIV Screein2022- Nonreactive  Hepatitis C Screen: 2022- Nonreactive  /Low Dose CT Scan: N/A    Health Maintenance Topics with due status: Not Due       Topic Last Completion Date    Eye Exam 2022    Diabetes Urine Screening 2022    Foot Exam 2023    Lipid Panel 2023    Hemoglobin A1c 2023    TETANUS VACCINE 2023     Health Maintenance Due   Topic Date Due    COVID-19 Vaccine (3 - Booster for Zach series) 2022       Incontinence  Bowel: No  Bladder: No    Lab results available in Epic or see dates from [facility] above:   Lab Results   Component Value Date    CHOL 150 2023    CHOL 152 2022    CHOL 135 2022     Lab Results   Component Value Date    HDL 43 2023    HDL 48 2022    HDL 40 2022     Lab Results   Component Value Date    LDLCALC 94 2023    LDLCALC 90 2022    LDLCALC 81  06/22/2022     Lab Results   Component Value Date    TRIG 64 03/20/2023    TRIG 70 12/19/2022    TRIG 72 06/22/2022     Lab Results   Component Value Date    CHOLHDL 3.5 03/20/2023    CHOLHDL 3.2 12/19/2022    CHOLHDL 3.4 06/22/2022       Lab Results   Component Value Date    HGBA1C 5.9 03/20/2023       Sodium   Date Value Ref Range Status   03/20/2023 136 136 - 145 mmol/L Final     Potassium   Date Value Ref Range Status   03/20/2023 3.5 3.5 - 5.1 mmol/L Final     Chloride   Date Value Ref Range Status   03/20/2023 96 (L) 98 - 107 mmol/L Final     CO2   Date Value Ref Range Status   03/20/2023 36 (H) 21 - 32 mmol/L Final     Glucose   Date Value Ref Range Status   03/20/2023 85 74 - 106 mg/dL Final     BUN   Date Value Ref Range Status   03/20/2023 10 7 - 18 mg/dL Final     Creatinine   Date Value Ref Range Status   03/20/2023 1.26 0.70 - 1.30 mg/dL Final     Calcium   Date Value Ref Range Status   03/20/2023 9.8 8.5 - 10.1 mg/dL Final     Total Protein   Date Value Ref Range Status   03/20/2023 8.3 (H) 6.4 - 8.2 g/dL Final     Albumin   Date Value Ref Range Status   03/20/2023 3.9 3.5 - 5.0 g/dL Final     Bilirubin, Total   Date Value Ref Range Status   03/20/2023 0.5 >0.0 - 1.2 mg/dL Final     Alk Phos   Date Value Ref Range Status   03/20/2023 112 45 - 115 U/L Final     AST   Date Value Ref Range Status   03/20/2023 76 (H) 15 - 37 U/L Final     ALT   Date Value Ref Range Status   03/20/2023 66 (H) 16 - 61 U/L Final     Anion Gap   Date Value Ref Range Status   03/20/2023 8 7 - 16 mmol/L Final     eGFR    Date Value Ref Range Status   12/01/2021 79 >=60 mL/min/1.73m² Final     eGFR   Date Value Ref Range Status   06/22/2022 70 >=60 mL/min/1.73m² Final           Care Team   PCP: Georgia Kc DNP           **See Completed Assessments for Annual Wellness visit within the encounter summary    The following assessments were completed & reviewed:  Depression Screening  Cognitive function  "Screening  Timed Get Up Test  Whisper Test  Vision Screen  Health Risk Assessment  Checklist of ADLs and IADLs      Objective  Vitals:    06/13/23 0956 06/13/23 1020   BP: (!) 145/103 126/73   Pulse: 73    Resp: (!) 21    SpO2: 97%    Weight: 129.3 kg (285 lb)    Height: 5' 8" (1.727 m)    PainSc:   3    PainLoc: Foot       Body mass index is 43.33 kg/m².  Ideal body weight: 68.4 kg (150 lb 12.7 oz)       Physical Exam  Vitals and nursing note reviewed.   Constitutional:       Appearance: Normal appearance. He is obese.   HENT:      Head: Normocephalic and atraumatic.      Nose: Nose normal.      Mouth/Throat:      Mouth: Mucous membranes are moist.   Eyes:      Pupils: Pupils are equal, round, and reactive to light.   Cardiovascular:      Rate and Rhythm: Normal rate and regular rhythm.      Pulses: Normal pulses.           Dorsalis pedis pulses are 2+ on the right side and 2+ on the left side.        Posterior tibial pulses are 2+ on the right side and 2+ on the left side.      Heart sounds: Normal heart sounds.   Pulmonary:      Effort: Pulmonary effort is normal.      Breath sounds: Normal breath sounds.   Abdominal:      General: Abdomen is flat. Bowel sounds are normal.      Palpations: Abdomen is soft.   Musculoskeletal:         General: Swelling and tenderness present. Normal range of motion.      Cervical back: Normal range of motion and neck supple.      Comments: Rt great toe swelling warmth erythematous.    Feet:      Right foot:      Protective Sensation: 10 sites tested.  10 sites sensed.      Skin integrity: Callus and dry skin present.      Toenail Condition: Right toenails are abnormally thick and long. Fungal disease present.     Left foot:      Protective Sensation: 10 sites tested.  10 sites sensed.      Skin integrity: Callus and dry skin present.      Toenail Condition: Left toenails are abnormally thick and long. Fungal disease present.  Skin:     General: Skin is warm and dry.      Capillary " Refill: Capillary refill takes less than 2 seconds.   Neurological:      General: No focal deficit present.      Mental Status: He is alert and oriented to person, place, and time. Mental status is at baseline.   Psychiatric:         Mood and Affect: Mood normal.         Behavior: Behavior normal.         Thought Content: Thought content normal.         Judgment: Judgment normal.       Assessment:     1. Encounter for subsequent annual wellness visit (AWV) in Medicare patient    2. Essential hypertension, malignant    3. Other hyperlipidemia    4. Prediabetes    5. Gout, unspecified cause, unspecified chronicity, unspecified site    6. Anemia, unspecified type    7. Morbid obesity    8. BMI 40.0-44.9, adult         Plan:    Referrals/Orders:  None     Advised to call office if does not hear from anyone with referral appt within 2-3 weeks to check on status of referral. Voiced understanding.    Keep current on appts & continue medications as ordered. Encouraged diet & exercise to decrease weight/BMI.      Discussed and provided with a screening schedule and personal prevention plan in accordance with USPSTF age appropriate recommendations and Medicare screening guidelines.   Education, counseling, and referrals were provided as needed.  After Visit Summary printed and given to patient which includes written education and a list of any referrals if indicated. All education discussed with patient & handouts given to patient.      F/u plan for yearly AWV.

## 2023-07-27 ENCOUNTER — OFFICE VISIT (OUTPATIENT)
Dept: FAMILY MEDICINE | Facility: CLINIC | Age: 33
End: 2023-07-27
Payer: MEDICARE

## 2023-07-27 VITALS
WEIGHT: 282 LBS | RESPIRATION RATE: 18 BRPM | HEIGHT: 68 IN | BODY MASS INDEX: 42.74 KG/M2 | TEMPERATURE: 98 F | SYSTOLIC BLOOD PRESSURE: 131 MMHG | HEART RATE: 84 BPM | OXYGEN SATURATION: 97 % | DIASTOLIC BLOOD PRESSURE: 84 MMHG

## 2023-07-27 DIAGNOSIS — M10.9 GOUT, UNSPECIFIED CAUSE, UNSPECIFIED CHRONICITY, UNSPECIFIED SITE: ICD-10-CM

## 2023-07-27 DIAGNOSIS — Z79.899 OTHER LONG TERM (CURRENT) DRUG THERAPY: ICD-10-CM

## 2023-07-27 DIAGNOSIS — M10.071 ACUTE IDIOPATHIC GOUT OF RIGHT FOOT: Primary | ICD-10-CM

## 2023-07-27 PROCEDURE — 96372 THER/PROPH/DIAG INJ SC/IM: CPT | Mod: ,,, | Performed by: NURSE PRACTITIONER

## 2023-07-27 PROCEDURE — 96372 PR INJECTION,THERAP/PROPH/DIAG2ST, IM OR SUBCUT: ICD-10-PCS | Mod: ,,, | Performed by: NURSE PRACTITIONER

## 2023-07-27 PROCEDURE — 99213 PR OFFICE/OUTPT VISIT, EST, LEVL III, 20-29 MIN: ICD-10-PCS | Mod: ,,, | Performed by: NURSE PRACTITIONER

## 2023-07-27 PROCEDURE — 99213 OFFICE O/P EST LOW 20 MIN: CPT | Mod: ,,, | Performed by: NURSE PRACTITIONER

## 2023-07-27 RX ORDER — NYSTATIN 100000 U/G
OINTMENT TOPICAL 2 TIMES DAILY
Qty: 30 G | Refills: 0 | Status: SHIPPED | OUTPATIENT
Start: 2023-07-27 | End: 2023-09-20

## 2023-07-27 RX ORDER — DEXAMETHASONE SODIUM PHOSPHATE 4 MG/ML
4 INJECTION, SOLUTION INTRA-ARTICULAR; INTRALESIONAL; INTRAMUSCULAR; INTRAVENOUS; SOFT TISSUE
Status: COMPLETED | OUTPATIENT
Start: 2023-07-27 | End: 2023-07-27

## 2023-07-27 RX ORDER — DICLOFENAC SODIUM 50 MG/1
50 TABLET, DELAYED RELEASE ORAL 2 TIMES DAILY
Qty: 60 TABLET | Refills: 1 | Status: SHIPPED | OUTPATIENT
Start: 2023-07-27 | End: 2023-09-20 | Stop reason: SDUPTHER

## 2023-07-27 RX ORDER — KETOROLAC TROMETHAMINE 30 MG/ML
60 INJECTION, SOLUTION INTRAMUSCULAR; INTRAVENOUS
Status: COMPLETED | OUTPATIENT
Start: 2023-07-27 | End: 2023-07-27

## 2023-07-27 RX ORDER — COLCHICINE 0.6 MG/1
TABLET ORAL
Qty: 8 TABLET | Refills: 0 | Status: SHIPPED | OUTPATIENT
Start: 2023-07-27 | End: 2023-09-20

## 2023-07-27 RX ORDER — NYSTATIN 100000 [USP'U]/G
POWDER TOPICAL 2 TIMES DAILY
Qty: 60 G | Refills: 0 | Status: SHIPPED | OUTPATIENT
Start: 2023-07-27 | End: 2023-09-20

## 2023-07-27 RX ORDER — METHYLPREDNISOLONE 4 MG/1
TABLET ORAL
Qty: 21 TABLET | Refills: 0 | Status: SHIPPED | OUTPATIENT
Start: 2023-07-27 | End: 2023-09-20

## 2023-07-27 RX ADMIN — DEXAMETHASONE SODIUM PHOSPHATE 4 MG: 4 INJECTION, SOLUTION INTRA-ARTICULAR; INTRALESIONAL; INTRAMUSCULAR; INTRAVENOUS; SOFT TISSUE at 09:07

## 2023-07-27 RX ADMIN — KETOROLAC TROMETHAMINE 60 MG: 30 INJECTION, SOLUTION INTRAMUSCULAR; INTRAVENOUS at 09:07

## 2023-07-27 NOTE — PROGRESS NOTES
"   Georgia Kc DNP   [address] [place] [address]     PATIENT NAME: Maldonado Levy  : 1990  DATE: 23  MRN: [id number]      Billing Provider: Georgia Kc DNP  Level of Service:   Patient PCP Information       Provider PCP Type    Georgia Kc DNP General            Reason for Visit / Chief Complaint: Toe Pain (Right foot toe pain gout)       Update PCP  Update Chief Complaint         History of Present Illness / Problem Focused Workflow     Maldonado Levy presents to the clinic with Toe Pain (Right foot toe pain gout)     Toe Pain     Review of Systems     Review of Systems     Medical / Social / Family History     Past Medical History:   Diagnosis Date    Diabetes mellitus, type 2     Gout, unspecified     Hyperlipidemia     Hypertension        History reviewed. No pertinent surgical history.    Social History    reports that he has never smoked. He has never used smokeless tobacco. He reports that he does not drink alcohol and does not use drugs.    Family History  's family history is not on file.    Medications and Allergies     Medications  No outpatient medications have been marked as taking for the 23 encounter (Office Visit) with Georgia Kc DNP.     Current Facility-Administered Medications for the 23 encounter (Office Visit) with Georgia Kc DNP   Medication Dose Route Frequency Provider Last Rate Last Admin    [COMPLETED] dexAMETHasone injection 4 mg  4 mg Intramuscular 1 time in Clinic/HOD Georgia Kc DNP   4 mg at 23 0900    [COMPLETED] ketorolac injection 60 mg  60 mg Intramuscular 1 time in Clinic/HOD Georgia Kc DNP   60 mg at 23 0900       Allergies  Review of patient's allergies indicates:  No Known Allergies    Physical Examination   /84 (BP Location: Left arm, Patient Position: Sitting, BP Method: Large (Automatic))   Pulse 84   Temp 98.2 °F (36.8 °C) (Oral)   Resp 18   Ht 5' 8" " (1.727 m)   Wt 127.9 kg (282 lb)   SpO2 97%   BMI 42.88 kg/m²    Physical Exam  Vitals and nursing note reviewed.   Constitutional:       Appearance: Normal appearance. He is obese.   HENT:      Head: Normocephalic and atraumatic.      Nose: Nose normal.      Mouth/Throat:      Mouth: Mucous membranes are moist.   Eyes:      Pupils: Pupils are equal, round, and reactive to light.   Cardiovascular:      Rate and Rhythm: Normal rate and regular rhythm.      Pulses: Normal pulses.      Heart sounds: Normal heart sounds.   Pulmonary:      Effort: Pulmonary effort is normal.      Breath sounds: Normal breath sounds.   Abdominal:      General: Abdomen is flat. Bowel sounds are normal.      Palpations: Abdomen is soft.   Musculoskeletal:         General: Swelling and tenderness present. Normal range of motion.      Cervical back: Normal range of motion and neck supple.      Comments: Rt great toe swelling warmth erythematous.    Feet:      Right foot:      Protective Sensation:   10 sites sensed.   Skin:     General: Skin is warm and dry.      Capillary Refill: Capillary refill takes less than 2 seconds.   Neurological:      General: No focal deficit present.      Mental Status: He is alert and oriented to person, place, and time. Mental status is at baseline.   Psychiatric:         Mood and Affect: Mood normal.         Behavior: Behavior normal.         Thought Content: Thought content normal.         Judgment: Judgment normal.        Assessment and Plan (including Health Maintenance)      Problem List  Smart Sets  Document Outside HM   :    Plan:         Health Maintenance Due   Topic Date Due    COVID-19 Vaccine (3 - Booster for Zach series) 08/26/2022    Eye Exam  09/27/2023       Problem List Items Addressed This Visit          Endocrine    BMI 40.0-44.9, adult       Orthopedic    Acute idiopathic gout of right foot - Primary    Gout       Palliative Care    Other long term (current) drug therapy       Health  Maintenance Topics with due status: Not Due       Topic Last Completion Date    Influenza Vaccine 10/25/2022    Diabetes Urine Screening 12/19/2022    Lipid Panel 03/20/2023    Hemoglobin A1c 03/20/2023    TETANUS VACCINE 03/27/2023    Foot Exam 06/13/2023       Future Appointments   Date Time Provider Department Center   9/20/2023  9:30 AM Georgia Kc DNP [facility]   6/17/2024  2:00 PM AWV NURSE, [facility]ILY [facility]IC[facility]            Signature:  Georgia Kc DNP      [address] [place] [address]    Date of encounter: 7/27/23

## 2023-08-02 RX ORDER — ATORVASTATIN CALCIUM 10 MG/1
10 TABLET, FILM COATED ORAL DAILY
Qty: 90 TABLET | Refills: 1 | Status: SHIPPED | OUTPATIENT
Start: 2023-08-02 | End: 2023-09-20 | Stop reason: SDUPTHER

## 2023-08-02 RX ORDER — PANTOPRAZOLE SODIUM 40 MG/1
40 TABLET, DELAYED RELEASE ORAL DAILY
Qty: 90 TABLET | Refills: 1 | Status: SHIPPED | OUTPATIENT
Start: 2023-08-02 | End: 2024-02-06 | Stop reason: SDUPTHER

## 2023-08-02 NOTE — TELEPHONE ENCOUNTER
----- Message from Fern Choi sent at 8/2/2023  9:06 AM CDT -----  Patient needs refills on his atorvastatin 10 mg and pantoprazole 40 mg sent to [facility] in [place] [phone].

## 2023-08-15 RX ORDER — POTASSIUM CHLORIDE 20 MEQ/15ML
20 SOLUTION ORAL DAILY
Qty: 1350 ML | Refills: 1 | Status: SHIPPED | OUTPATIENT
Start: 2023-08-15 | End: 2023-11-13

## 2023-08-15 NOTE — TELEPHONE ENCOUNTER
----- Message from Fern Choi sent at 8/15/2023  3:27 PM CDT -----  Patient needs refills on his potassium sent to [facility] in [place] [phone].

## 2023-09-18 PROBLEM — Z00.00 ENCOUNTER FOR SUBSEQUENT ANNUAL WELLNESS VISIT (AWV) IN MEDICARE PATIENT: Status: RESOLVED | Noted: 2022-06-08 | Resolved: 2023-09-18

## 2023-09-20 ENCOUNTER — OFFICE VISIT (OUTPATIENT)
Dept: FAMILY MEDICINE | Facility: CLINIC | Age: 33
End: 2023-09-20
Payer: MEDICARE

## 2023-09-20 VITALS
OXYGEN SATURATION: 95 % | BODY MASS INDEX: 42.95 KG/M2 | DIASTOLIC BLOOD PRESSURE: 88 MMHG | SYSTOLIC BLOOD PRESSURE: 138 MMHG | HEART RATE: 78 BPM | HEIGHT: 68 IN | WEIGHT: 283.38 LBS

## 2023-09-20 DIAGNOSIS — N39.44 NOCTURNAL ENURESIS: ICD-10-CM

## 2023-09-20 DIAGNOSIS — I10 ESSENTIAL HYPERTENSION, MALIGNANT: ICD-10-CM

## 2023-09-20 DIAGNOSIS — E78.49 OTHER HYPERLIPIDEMIA: ICD-10-CM

## 2023-09-20 DIAGNOSIS — Z79.899 OTHER LONG TERM (CURRENT) DRUG THERAPY: ICD-10-CM

## 2023-09-20 DIAGNOSIS — R73.03 PREDIABETES: Primary | ICD-10-CM

## 2023-09-20 DIAGNOSIS — M10.071 ACUTE IDIOPATHIC GOUT OF RIGHT FOOT: ICD-10-CM

## 2023-09-20 DIAGNOSIS — D64.9 ANEMIA, UNSPECIFIED TYPE: ICD-10-CM

## 2023-09-20 DIAGNOSIS — I10 ESSENTIAL HYPERTENSION: ICD-10-CM

## 2023-09-20 DIAGNOSIS — Z13.6 ENCOUNTER FOR SCREENING FOR CARDIOVASCULAR DISORDERS: ICD-10-CM

## 2023-09-20 DIAGNOSIS — M10.9 GOUT, UNSPECIFIED CAUSE, UNSPECIFIED CHRONICITY, UNSPECIFIED SITE: ICD-10-CM

## 2023-09-20 LAB
25(OH)D3 SERPL-MCNC: 26 NG/ML
ALBUMIN SERPL BCP-MCNC: 3.7 G/DL (ref 3.5–5)
ALBUMIN/GLOB SERPL: 0.8 {RATIO}
ALP SERPL-CCNC: 113 U/L (ref 45–115)
ALT SERPL W P-5'-P-CCNC: 63 U/L (ref 16–61)
ANION GAP SERPL CALCULATED.3IONS-SCNC: 6 MMOL/L (ref 7–16)
AST SERPL W P-5'-P-CCNC: 40 U/L (ref 15–37)
BASOPHILS # BLD AUTO: 0.02 K/UL (ref 0–0.2)
BASOPHILS NFR BLD AUTO: 0.3 % (ref 0–1)
BILIRUB SERPL-MCNC: 0.4 MG/DL (ref ?–1.2)
BUN SERPL-MCNC: 13 MG/DL (ref 7–18)
BUN/CREAT SERPL: 10 (ref 6–20)
CALCIUM SERPL-MCNC: 9.5 MG/DL (ref 8.5–10.1)
CHLORIDE SERPL-SCNC: 98 MMOL/L (ref 98–107)
CHOLEST SERPL-MCNC: 133 MG/DL (ref 0–200)
CHOLEST/HDLC SERPL: 3.4 {RATIO}
CO2 SERPL-SCNC: 35 MMOL/L (ref 21–32)
CREAT SERPL-MCNC: 1.29 MG/DL (ref 0.7–1.3)
DIFFERENTIAL METHOD BLD: ABNORMAL
EGFR (NO RACE VARIABLE) (RUSH/TITUS): 75 ML/MIN/1.73M2
EOSINOPHIL # BLD AUTO: 0.15 K/UL (ref 0–0.5)
EOSINOPHIL NFR BLD AUTO: 2.3 % (ref 1–4)
ERYTHROCYTE [DISTWIDTH] IN BLOOD BY AUTOMATED COUNT: 13.3 % (ref 11.5–14.5)
EST. AVERAGE GLUCOSE BLD GHB EST-MCNC: 100 MG/DL
FOLATE SERPL-MCNC: >20 NG/ML (ref 3.1–17.5)
GLOBULIN SER-MCNC: 4.9 G/DL (ref 2–4)
GLUCOSE SERPL-MCNC: 113 MG/DL (ref 74–106)
HBA1C MFR BLD HPLC: 5.6 % (ref 4.5–6.6)
HCT VFR BLD AUTO: 38.7 % (ref 40–54)
HDLC SERPL-MCNC: 39 MG/DL (ref 40–60)
HGB BLD-MCNC: 12.7 G/DL (ref 13.5–18)
IMM GRANULOCYTES # BLD AUTO: 0.01 K/UL (ref 0–0.04)
IMM GRANULOCYTES NFR BLD: 0.2 % (ref 0–0.4)
IRON SATN MFR SERPL: 17 % (ref 14–50)
IRON SERPL-MCNC: 48 ΜG/DL (ref 65–175)
LDLC SERPL CALC-MCNC: 78 MG/DL
LDLC/HDLC SERPL: 2 {RATIO}
LYMPHOCYTES # BLD AUTO: 1.66 K/UL (ref 1–4.8)
LYMPHOCYTES NFR BLD AUTO: 25.2 % (ref 27–41)
MCH RBC QN AUTO: 27.9 PG (ref 27–31)
MCHC RBC AUTO-ENTMCNC: 32.8 G/DL (ref 32–36)
MCV RBC AUTO: 84.9 FL (ref 80–96)
MONOCYTES # BLD AUTO: 0.55 K/UL (ref 0–0.8)
MONOCYTES NFR BLD AUTO: 8.3 % (ref 2–6)
MPC BLD CALC-MCNC: 9.5 FL (ref 9.4–12.4)
NEUTROPHILS # BLD AUTO: 4.2 K/UL (ref 1.8–7.7)
NEUTROPHILS NFR BLD AUTO: 63.7 % (ref 53–65)
NONHDLC SERPL-MCNC: 94 MG/DL
NRBC # BLD AUTO: 0 X10E3/UL
NRBC, AUTO (.00): 0 %
PLATELET # BLD AUTO: 369 K/UL (ref 150–400)
POTASSIUM SERPL-SCNC: 3.1 MMOL/L (ref 3.5–5.1)
PROT SERPL-MCNC: 8.6 G/DL (ref 6.4–8.2)
RBC # BLD AUTO: 4.56 M/UL (ref 4.6–6.2)
SODIUM SERPL-SCNC: 136 MMOL/L (ref 136–145)
T4 FREE SERPL-MCNC: 1.09 NG/DL (ref 0.76–1.46)
TIBC SERPL-MCNC: 278 ΜG/DL (ref 250–450)
TRIGL SERPL-MCNC: 79 MG/DL (ref 35–150)
TSH SERPL DL<=0.005 MIU/L-ACNC: 1.42 UIU/ML (ref 0.36–3.74)
URATE SERPL-MCNC: 10 MG/DL (ref 3.5–7.2)
VIT B12 SERPL-MCNC: 474 PG/ML (ref 193–986)
VLDLC SERPL-MCNC: 16 MG/DL
WBC # BLD AUTO: 6.59 K/UL (ref 4.5–11)

## 2023-09-20 PROCEDURE — 82306 VITAMIN D: ICD-10-PCS | Mod: ,,, | Performed by: CLINICAL MEDICAL LABORATORY

## 2023-09-20 PROCEDURE — 84443 THYROID PANEL: ICD-10-PCS | Mod: ,,, | Performed by: CLINICAL MEDICAL LABORATORY

## 2023-09-20 PROCEDURE — 82746 VITAMIN B12/FOLATE, SERUM PANEL: ICD-10-PCS | Mod: ,,, | Performed by: CLINICAL MEDICAL LABORATORY

## 2023-09-20 PROCEDURE — 84550 ASSAY OF BLOOD/URIC ACID: CPT | Mod: ,,, | Performed by: CLINICAL MEDICAL LABORATORY

## 2023-09-20 PROCEDURE — 90686 IIV4 VACC NO PRSV 0.5 ML IM: CPT | Mod: ,,, | Performed by: NURSE PRACTITIONER

## 2023-09-20 PROCEDURE — 83036 HEMOGLOBIN GLYCOSYLATED A1C: CPT | Mod: ,,, | Performed by: CLINICAL MEDICAL LABORATORY

## 2023-09-20 PROCEDURE — 83550 IRON BINDING TEST: CPT | Mod: ,,, | Performed by: CLINICAL MEDICAL LABORATORY

## 2023-09-20 PROCEDURE — 82607 VITAMIN B12/FOLATE, SERUM PANEL: ICD-10-PCS | Mod: ,,, | Performed by: CLINICAL MEDICAL LABORATORY

## 2023-09-20 PROCEDURE — 85025 COMPLETE CBC W/AUTO DIFF WBC: CPT | Mod: ,,, | Performed by: CLINICAL MEDICAL LABORATORY

## 2023-09-20 PROCEDURE — 84439 THYROID PANEL: ICD-10-PCS | Mod: ,,, | Performed by: CLINICAL MEDICAL LABORATORY

## 2023-09-20 PROCEDURE — 83540 IRON AND TIBC: ICD-10-PCS | Mod: ,,, | Performed by: CLINICAL MEDICAL LABORATORY

## 2023-09-20 PROCEDURE — 82306 VITAMIN D 25 HYDROXY: CPT | Mod: ,,, | Performed by: CLINICAL MEDICAL LABORATORY

## 2023-09-20 PROCEDURE — 99214 PR OFFICE/OUTPT VISIT, EST, LEVL IV, 30-39 MIN: ICD-10-PCS | Mod: ,,, | Performed by: NURSE PRACTITIONER

## 2023-09-20 PROCEDURE — 83036 HEMOGLOBIN A1C: ICD-10-PCS | Mod: ,,, | Performed by: CLINICAL MEDICAL LABORATORY

## 2023-09-20 PROCEDURE — 99214 OFFICE O/P EST MOD 30 MIN: CPT | Mod: ,,, | Performed by: NURSE PRACTITIONER

## 2023-09-20 PROCEDURE — 83540 ASSAY OF IRON: CPT | Mod: ,,, | Performed by: CLINICAL MEDICAL LABORATORY

## 2023-09-20 PROCEDURE — 84550 URIC ACID: ICD-10-PCS | Mod: ,,, | Performed by: CLINICAL MEDICAL LABORATORY

## 2023-09-20 PROCEDURE — 84443 ASSAY THYROID STIM HORMONE: CPT | Mod: ,,, | Performed by: CLINICAL MEDICAL LABORATORY

## 2023-09-20 PROCEDURE — 82746 ASSAY OF FOLIC ACID SERUM: CPT | Mod: ,,, | Performed by: CLINICAL MEDICAL LABORATORY

## 2023-09-20 PROCEDURE — G0008 FLU VACCINE (QUAD) GREATER THAN OR EQUAL TO 3YO PRESERVATIVE FREE IM: ICD-10-PCS | Mod: ,,, | Performed by: NURSE PRACTITIONER

## 2023-09-20 PROCEDURE — 80061 LIPID PANEL: CPT | Mod: ,,, | Performed by: CLINICAL MEDICAL LABORATORY

## 2023-09-20 PROCEDURE — 80053 COMPREHEN METABOLIC PANEL: CPT | Mod: ,,, | Performed by: CLINICAL MEDICAL LABORATORY

## 2023-09-20 PROCEDURE — 84439 ASSAY OF FREE THYROXINE: CPT | Mod: ,,, | Performed by: CLINICAL MEDICAL LABORATORY

## 2023-09-20 PROCEDURE — 90686 FLU VACCINE (QUAD) GREATER THAN OR EQUAL TO 3YO PRESERVATIVE FREE IM: ICD-10-PCS | Mod: ,,, | Performed by: NURSE PRACTITIONER

## 2023-09-20 PROCEDURE — G0008 ADMIN INFLUENZA VIRUS VAC: HCPCS | Mod: ,,, | Performed by: NURSE PRACTITIONER

## 2023-09-20 PROCEDURE — 80061 LIPID PANEL: ICD-10-PCS | Mod: ,,, | Performed by: CLINICAL MEDICAL LABORATORY

## 2023-09-20 PROCEDURE — 82607 VITAMIN B-12: CPT | Mod: ,,, | Performed by: CLINICAL MEDICAL LABORATORY

## 2023-09-20 PROCEDURE — 83550 IRON AND TIBC: ICD-10-PCS | Mod: ,,, | Performed by: CLINICAL MEDICAL LABORATORY

## 2023-09-20 PROCEDURE — 85025 CBC WITH DIFFERENTIAL: ICD-10-PCS | Mod: ,,, | Performed by: CLINICAL MEDICAL LABORATORY

## 2023-09-20 PROCEDURE — 80053 COMPREHENSIVE METABOLIC PANEL: ICD-10-PCS | Mod: ,,, | Performed by: CLINICAL MEDICAL LABORATORY

## 2023-09-20 RX ORDER — LOSARTAN POTASSIUM AND HYDROCHLOROTHIAZIDE 25; 100 MG/1; MG/1
1 TABLET ORAL DAILY
Qty: 90 TABLET | Refills: 1 | Status: SHIPPED | OUTPATIENT
Start: 2023-09-20 | End: 2024-03-20 | Stop reason: SDUPTHER

## 2023-09-20 RX ORDER — ALLOPURINOL 300 MG/1
300 TABLET ORAL DAILY
Qty: 90 TABLET | Refills: 3 | Status: SHIPPED | OUTPATIENT
Start: 2023-09-20

## 2023-09-20 RX ORDER — RISPERIDONE 3 MG/1
3 TABLET ORAL 2 TIMES DAILY
Qty: 180 TABLET | Refills: 1 | Status: SHIPPED | OUTPATIENT
Start: 2023-09-20

## 2023-09-20 RX ORDER — DICLOFENAC SODIUM 50 MG/1
50 TABLET, DELAYED RELEASE ORAL 2 TIMES DAILY
Qty: 60 TABLET | Refills: 1 | Status: SHIPPED | OUTPATIENT
Start: 2023-09-20 | End: 2023-09-20

## 2023-09-20 RX ORDER — AMLODIPINE BESYLATE 10 MG/1
10 TABLET ORAL DAILY
Qty: 90 TABLET | Refills: 1 | Status: SHIPPED | OUTPATIENT
Start: 2023-09-20 | End: 2024-03-20 | Stop reason: SDUPTHER

## 2023-09-20 RX ORDER — PAROXETINE HYDROCHLORIDE 40 MG/1
40 TABLET, FILM COATED ORAL DAILY
Qty: 90 TABLET | Refills: 1 | Status: SHIPPED | OUTPATIENT
Start: 2023-09-20

## 2023-09-20 RX ORDER — NYSTATIN 100000 U/G
OINTMENT TOPICAL 2 TIMES DAILY
Qty: 30 G | Refills: 0 | Status: SHIPPED | OUTPATIENT
Start: 2023-09-20 | End: 2024-03-20

## 2023-09-20 RX ORDER — METFORMIN HYDROCHLORIDE 500 MG/1
500 TABLET ORAL
Qty: 90 TABLET | Refills: 1 | Status: SHIPPED | OUTPATIENT
Start: 2023-09-20 | End: 2024-03-20 | Stop reason: SDUPTHER

## 2023-09-20 RX ORDER — ALLOPURINOL 100 MG/1
200 TABLET ORAL DAILY
Qty: 180 TABLET | Refills: 1 | Status: SHIPPED | OUTPATIENT
Start: 2023-09-20 | End: 2023-09-20

## 2023-09-20 RX ORDER — BUSPIRONE HYDROCHLORIDE 15 MG/1
15 TABLET ORAL 2 TIMES DAILY
Qty: 180 TABLET | Refills: 1 | Status: SHIPPED | OUTPATIENT
Start: 2023-09-20

## 2023-09-20 RX ORDER — METOPROLOL SUCCINATE 50 MG/1
50 TABLET, EXTENDED RELEASE ORAL DAILY
Qty: 90 TABLET | Refills: 1 | Status: SHIPPED | OUTPATIENT
Start: 2023-09-20 | End: 2024-03-20 | Stop reason: SDUPTHER

## 2023-09-20 RX ORDER — ATORVASTATIN CALCIUM 10 MG/1
10 TABLET, FILM COATED ORAL DAILY
Qty: 90 TABLET | Refills: 1 | Status: SHIPPED | OUTPATIENT
Start: 2023-09-20 | End: 2024-02-06 | Stop reason: SDUPTHER

## 2023-09-20 NOTE — PROGRESS NOTES
Georgia Kc DNP   [address] [place] [address]     PATIENT NAME: Maldonado Levy  : 1990  DATE: 23  MRN: [id number]      Billing Provider: Georgia Kc DNP  Level of Service:   Patient PCP Information       Provider PCP Type    Georgia Kc DNP General            Reason for Visit / Chief Complaint: here for fu appt       Update PCP  Update Chief Complaint         History of Present Illness / Problem Focused Workflow     Maldonado Levy presents to the clinic with No chief complaint on file.     HPI    Review of Systems     Review of Systems     Medical / Social / Family History     Past Medical History:   Diagnosis Date    Diabetes mellitus, type 2     Gout, unspecified     Hyperlipidemia     Hypertension        No past surgical history on file.    Social History    reports that he has never smoked. He has never used smokeless tobacco. He reports that he does not drink alcohol and does not use drugs.    Family History  's family history is not on file.    Medications and Allergies     Medications  No outpatient medications have been marked as taking for the 23 encounter (Appointment) with Georgia Kc DNP.       Allergies  Review of patient's allergies indicates:  No Known Allergies    Physical Examination   There were no vitals taken for this visit.   Physical Exam  Vitals and nursing note reviewed.   Constitutional:       Appearance: Normal appearance. He is obese.   HENT:      Head: Normocephalic and atraumatic.      Nose: Nose normal.      Mouth/Throat:      Mouth: Mucous membranes are moist.   Eyes:      Pupils: Pupils are equal, round, and reactive to light.   Cardiovascular:      Rate and Rhythm: Normal rate and regular rhythm.      Pulses: Normal pulses.           Dorsalis pedis pulses are 2+ on the right side and 2+ on the left side.        Posterior tibial pulses are 2+ on the right side and 2+ on the left side.      Heart sounds: Normal  heart sounds.   Pulmonary:      Effort: Pulmonary effort is normal.      Breath sounds: Normal breath sounds.   Abdominal:      General: Abdomen is flat. Bowel sounds are normal.      Palpations: Abdomen is soft.   Musculoskeletal:         General: Swelling and tenderness present. Normal range of motion.      Cervical back: Normal range of motion and neck supple.      Comments: Rt great toe swelling warmth erythematous.    Feet:      Right foot:      Protective Sensation: 10 sites tested.  10 sites sensed.      Skin integrity: Callus and dry skin present.      Toenail Condition: Right toenails are abnormally thick and long. Fungal disease present.     Left foot:      Protective Sensation: 10 sites tested.  10 sites sensed.      Skin integrity: Callus and dry skin present.      Toenail Condition: Left toenails are abnormally thick and long. Fungal disease present.  Skin:     General: Skin is warm and dry.      Capillary Refill: Capillary refill takes less than 2 seconds.   Neurological:      General: No focal deficit present.      Mental Status: He is alert and oriented to person, place, and time. Mental status is at baseline.   Psychiatric:         Mood and Affect: Mood normal.         Behavior: Behavior normal.         Thought Content: Thought content normal.         Judgment: Judgment normal.        Assessment and Plan (including Health Maintenance)      Problem List  Smart Sets  Document Outside HM   :    Plan:   Continue current meds.. Labs today. Encouraged diet/exercise     Keep f/u with [facility]. 3/15/22 q3 months  bed wetting q2 months now better. - using pullups at bedtime occasionally.   otc fiber supplements for constipation  c'scopy in 11/2018.      Watch htn. Wgt up. Work on diet/exercise. goal 20lbs weight loss.     refill meds.  flu - 2022 at pharmacy  covid vaccine - 2021 J&J      See back 3 months .   Diabetic foot exam +2 pulses, dry and callused skin. Sees dr rendon in [place] podiatry. 1/2022 as  needed  Send to [facility] for diabetic shoe eval. Never got - 2020 [place] had to pay $139 for them,     optho - [facility] 9/2022  Pcv - 12/2022         Health Maintenance Due   Topic Date Due    COVID-19 Vaccine (3 - Booster for Zach series) 08/26/2022    Influenza Vaccine (1) 09/01/2023    Hemoglobin A1c  09/20/2023    Eye Exam  09/27/2023       Problem List Items Addressed This Visit    None      Health Maintenance Topics with due status: Not Due       Topic Last Completion Date    Diabetes Urine Screening 12/19/2022    Lipid Panel 03/20/2023    TETANUS VACCINE 03/27/2023    Foot Exam 07/27/2023       Future Appointments   Date Time Provider Department Center   9/20/2023  9:30 AM Georgia Kc DNP [facility]   6/17/2024  2:00 PM AWV NURSE, [facility]IC[facility]            Signature:  Georgia Kc DNP      [address] [place] [address]    Date of encounter: 9/20/23

## 2023-12-25 PROBLEM — Z13.6 ENCOUNTER FOR SCREENING FOR CARDIOVASCULAR DISORDERS: Status: RESOLVED | Noted: 2022-06-08 | Resolved: 2023-12-25

## 2024-01-09 DIAGNOSIS — Z71.89 COMPLEX CARE COORDINATION: ICD-10-CM

## 2024-01-19 DIAGNOSIS — D64.9 ANEMIA, UNSPECIFIED TYPE: ICD-10-CM

## 2024-01-19 RX ORDER — IRON,FM,PS/FOLIC/B,C18/L.CASEI 130-1.25MG
1 CAPSULE ORAL DAILY
Qty: 90 CAPSULE | Refills: 3 | Status: SHIPPED | OUTPATIENT
Start: 2024-01-19

## 2024-01-19 NOTE — TELEPHONE ENCOUNTER
----- Message from Tomasa Malave LPN sent at 1/18/2024  3:26 PM CST -----  Regarding: FW: refills    ----- Message -----  From: Loraine Kidd  Sent: 1/18/2024   1:32 PM CST  To: [facility] Staff  Subject: refills                                          Fusion plus capsule

## 2024-01-22 ENCOUNTER — CLINICAL SUPPORT (OUTPATIENT)
Dept: FAMILY MEDICINE | Facility: CLINIC | Age: 34
End: 2024-01-22
Payer: MEDICARE

## 2024-01-22 DIAGNOSIS — Z79.899 OTHER LONG TERM (CURRENT) DRUG THERAPY: Primary | ICD-10-CM

## 2024-02-06 RX ORDER — PANTOPRAZOLE SODIUM 40 MG/1
40 TABLET, DELAYED RELEASE ORAL DAILY
Qty: 90 TABLET | Refills: 0 | Status: SHIPPED | OUTPATIENT
Start: 2024-02-06 | End: 2024-03-20 | Stop reason: SDUPTHER

## 2024-02-06 RX ORDER — ATORVASTATIN CALCIUM 10 MG/1
10 TABLET, FILM COATED ORAL DAILY
Qty: 90 TABLET | Refills: 0 | Status: SHIPPED | OUTPATIENT
Start: 2024-02-06

## 2024-02-06 RX ORDER — PANTOPRAZOLE SODIUM 40 MG/1
40 TABLET, DELAYED RELEASE ORAL DAILY
Qty: 90 TABLET | Refills: 1 | Status: SHIPPED | OUTPATIENT
Start: 2024-02-06 | End: 2024-03-20 | Stop reason: SDUPTHER

## 2024-02-06 NOTE — TELEPHONE ENCOUNTER
----- Message from Geeta Malik sent at 2/6/2024  9:37 AM CST -----  Patient need a refill on medicine,   atorvastatin (LIPITOR) 10 MG tablet, pantoprazole (PROTONIX) 40 MG tablet, [facility] [address]   Phone: [phone]

## 2024-03-20 ENCOUNTER — OFFICE VISIT (OUTPATIENT)
Dept: FAMILY MEDICINE | Facility: CLINIC | Age: 34
End: 2024-03-20
Payer: MEDICARE

## 2024-03-20 VITALS
SYSTOLIC BLOOD PRESSURE: 134 MMHG | HEIGHT: 68 IN | BODY MASS INDEX: 43.5 KG/M2 | OXYGEN SATURATION: 99 % | HEART RATE: 86 BPM | WEIGHT: 287 LBS | DIASTOLIC BLOOD PRESSURE: 83 MMHG | TEMPERATURE: 98 F

## 2024-03-20 DIAGNOSIS — M10.9 GOUT, UNSPECIFIED CAUSE, UNSPECIFIED CHRONICITY, UNSPECIFIED SITE: ICD-10-CM

## 2024-03-20 DIAGNOSIS — I10 ESSENTIAL HYPERTENSION, MALIGNANT: Primary | ICD-10-CM

## 2024-03-20 DIAGNOSIS — E66.01 MORBID OBESITY: ICD-10-CM

## 2024-03-20 DIAGNOSIS — R73.03 PREDIABETES: ICD-10-CM

## 2024-03-20 DIAGNOSIS — F29 PSYCHOSIS, UNSPECIFIED PSYCHOSIS TYPE: ICD-10-CM

## 2024-03-20 DIAGNOSIS — N39.44 NOCTURNAL ENURESIS: ICD-10-CM

## 2024-03-20 DIAGNOSIS — E78.49 OTHER HYPERLIPIDEMIA: ICD-10-CM

## 2024-03-20 LAB
ALBUMIN SERPL BCP-MCNC: 3.9 G/DL (ref 3.5–5)
ALBUMIN/GLOB SERPL: 0.8 {RATIO}
ALP SERPL-CCNC: 134 U/L (ref 45–115)
ALT SERPL W P-5'-P-CCNC: 56 U/L (ref 16–61)
ANION GAP SERPL CALCULATED.3IONS-SCNC: 8 MMOL/L (ref 7–16)
AST SERPL W P-5'-P-CCNC: 44 U/L (ref 15–37)
BASOPHILS # BLD AUTO: 0.04 K/UL (ref 0–0.2)
BASOPHILS NFR BLD AUTO: 0.5 % (ref 0–1)
BILIRUB SERPL-MCNC: 0.3 MG/DL (ref ?–1.2)
BUN SERPL-MCNC: 12 MG/DL (ref 7–18)
BUN/CREAT SERPL: 9 (ref 6–20)
CALCIUM SERPL-MCNC: 9.8 MG/DL (ref 8.5–10.1)
CHLORIDE SERPL-SCNC: 97 MMOL/L (ref 98–107)
CHOLEST SERPL-MCNC: 138 MG/DL (ref 0–200)
CHOLEST/HDLC SERPL: 3 {RATIO}
CO2 SERPL-SCNC: 35 MMOL/L (ref 21–32)
CREAT SERPL-MCNC: 1.3 MG/DL (ref 0.7–1.3)
DIFFERENTIAL METHOD BLD: ABNORMAL
EGFR (NO RACE VARIABLE) (RUSH/TITUS): 73 ML/MIN/1.73M2
EOSINOPHIL # BLD AUTO: 0.24 K/UL (ref 0–0.5)
EOSINOPHIL NFR BLD AUTO: 3.2 % (ref 1–4)
ERYTHROCYTE [DISTWIDTH] IN BLOOD BY AUTOMATED COUNT: 13.8 % (ref 11.5–14.5)
GLOBULIN SER-MCNC: 5.2 G/DL (ref 2–4)
GLUCOSE SERPL-MCNC: 103 MG/DL (ref 74–106)
HCT VFR BLD AUTO: 41.9 % (ref 40–54)
HDLC SERPL-MCNC: 46 MG/DL (ref 40–60)
HGB BLD-MCNC: 13.3 G/DL (ref 13.5–18)
IMM GRANULOCYTES # BLD AUTO: 0.01 K/UL (ref 0–0.04)
IMM GRANULOCYTES NFR BLD: 0.1 % (ref 0–0.4)
LDLC SERPL CALC-MCNC: 70 MG/DL
LDLC/HDLC SERPL: 1.5 {RATIO}
LYMPHOCYTES # BLD AUTO: 1.8 K/UL (ref 1–4.8)
LYMPHOCYTES NFR BLD AUTO: 24.1 % (ref 27–41)
MCH RBC QN AUTO: 27.9 PG (ref 27–31)
MCHC RBC AUTO-ENTMCNC: 31.7 G/DL (ref 32–36)
MCV RBC AUTO: 87.8 FL (ref 80–96)
MONOCYTES # BLD AUTO: 0.56 K/UL (ref 0–0.8)
MONOCYTES NFR BLD AUTO: 7.5 % (ref 2–6)
MPC BLD CALC-MCNC: 9.8 FL (ref 9.4–12.4)
NEUTROPHILS # BLD AUTO: 4.83 K/UL (ref 1.8–7.7)
NEUTROPHILS NFR BLD AUTO: 64.6 % (ref 53–65)
NONHDLC SERPL-MCNC: 92 MG/DL
NRBC # BLD AUTO: 0 X10E3/UL
NRBC, AUTO (.00): 0 %
PLATELET # BLD AUTO: 381 K/UL (ref 150–400)
POTASSIUM SERPL-SCNC: 3 MMOL/L (ref 3.5–5.1)
PROT SERPL-MCNC: 9.1 G/DL (ref 6.4–8.2)
RBC # BLD AUTO: 4.77 M/UL (ref 4.6–6.2)
SODIUM SERPL-SCNC: 137 MMOL/L (ref 136–145)
TRIGL SERPL-MCNC: 108 MG/DL (ref 35–150)
URATE SERPL-MCNC: 5.8 MG/DL (ref 3.5–7.2)
VLDLC SERPL-MCNC: 22 MG/DL
WBC # BLD AUTO: 7.48 K/UL (ref 4.5–11)

## 2024-03-20 PROCEDURE — 80053 COMPREHEN METABOLIC PANEL: CPT | Mod: ,,, | Performed by: CLINICAL MEDICAL LABORATORY

## 2024-03-20 PROCEDURE — 99214 OFFICE O/P EST MOD 30 MIN: CPT | Mod: ,,, | Performed by: NURSE PRACTITIONER

## 2024-03-20 PROCEDURE — 84550 ASSAY OF BLOOD/URIC ACID: CPT | Mod: ,,, | Performed by: CLINICAL MEDICAL LABORATORY

## 2024-03-20 PROCEDURE — 83036 HEMOGLOBIN GLYCOSYLATED A1C: CPT | Mod: ,,, | Performed by: CLINICAL MEDICAL LABORATORY

## 2024-03-20 PROCEDURE — 80061 LIPID PANEL: CPT | Mod: ,,, | Performed by: CLINICAL MEDICAL LABORATORY

## 2024-03-20 PROCEDURE — 85025 COMPLETE CBC W/AUTO DIFF WBC: CPT | Mod: ,,, | Performed by: CLINICAL MEDICAL LABORATORY

## 2024-03-20 RX ORDER — POTASSIUM CHLORIDE 20 MEQ/15ML
20 SOLUTION ORAL DAILY
Qty: 1350 ML | Refills: 1 | Status: SHIPPED | OUTPATIENT
Start: 2024-03-20

## 2024-03-20 RX ORDER — PANTOPRAZOLE SODIUM 40 MG/1
40 TABLET, DELAYED RELEASE ORAL DAILY
Qty: 90 TABLET | Refills: 1 | Status: SHIPPED | OUTPATIENT
Start: 2024-03-20

## 2024-03-20 RX ORDER — METOPROLOL SUCCINATE 50 MG/1
50 TABLET, EXTENDED RELEASE ORAL DAILY
Qty: 90 TABLET | Refills: 1 | Status: SHIPPED | OUTPATIENT
Start: 2024-03-20

## 2024-03-20 RX ORDER — LOSARTAN POTASSIUM AND HYDROCHLOROTHIAZIDE 25; 100 MG/1; MG/1
1 TABLET ORAL DAILY
Qty: 90 TABLET | Refills: 1 | Status: SHIPPED | OUTPATIENT
Start: 2024-03-20 | End: 2025-03-20

## 2024-03-20 RX ORDER — POTASSIUM CHLORIDE 20 MEQ/15ML
20 SOLUTION ORAL DAILY
COMMUNITY
End: 2024-03-20 | Stop reason: SDUPTHER

## 2024-03-20 RX ORDER — AMLODIPINE BESYLATE 10 MG/1
10 TABLET ORAL DAILY
Qty: 90 TABLET | Refills: 1 | Status: SHIPPED | OUTPATIENT
Start: 2024-03-20 | End: 2025-03-20

## 2024-03-20 RX ORDER — DIPHENHYDRAMINE HCL 50 MG
50 CAPSULE ORAL EVERY 6 HOURS PRN
COMMUNITY

## 2024-03-20 RX ORDER — METFORMIN HYDROCHLORIDE 500 MG/1
500 TABLET ORAL
Qty: 90 TABLET | Refills: 1 | Status: SHIPPED | OUTPATIENT
Start: 2024-03-20

## 2024-03-20 NOTE — PROGRESS NOTES
Georgia Kc DNP   [address] [place] [address]     PATIENT NAME: Maldonado Levy  : 1990  DATE: 3/20/24  MRN: [id number]      Billing Provider: Georgia Kc DNP  Level of Service:   Patient PCP Information       Provider PCP Type    Georgia Kc DNP General            Reason for Visit / Chief Complaint: Follow-up, Hyperlipidemia, and Hypertension       Update PCP  Update Chief Complaint         History of Present Illness / Problem Focused Workflow     Maldonado Levy presents to the clinic with Follow-up, Hyperlipidemia, and Hypertension     HPI    Review of Systems     Review of Systems     Medical / Social / Family History     Past Medical History:   Diagnosis Date    Diabetes mellitus, type 2     Gout, unspecified     Hyperlipidemia     Hypertension        History reviewed. No pertinent surgical history.    Social History    reports that he has never smoked. He has never been exposed to tobacco smoke. He has never used smokeless tobacco. He reports that he does not drink alcohol and does not use drugs.    Family History  's family history is not on file.    Medications and Allergies     Medications  Outpatient Medications Marked as Taking for the 3/20/24 encounter (Office Visit) with Georgia Kc DNP   Medication Sig Dispense Refill    allopurinoL (ZYLOPRIM) 300 MG tablet Take 1 tablet (300 mg total) by mouth once daily. 90 tablet 3    atorvastatin (LIPITOR) 10 MG tablet Take 1 tablet (10 mg total) by mouth once daily. 90 tablet 0    busPIRone (BUSPAR) 15 MG tablet Take 1 tablet (15 mg total) by mouth 2 (two) times daily. 180 tablet 1    diphenhydrAMINE (BENADRYL) 50 MG capsule Take 50 mg by mouth every 6 (six) hours as needed for Itching.      iron fum,ps-FA-vit B,C#18-Lact (FUSION PLUS) 130 mg iron -1,250 mcg Cap Take 1 capsule by mouth once daily. 90 capsule 3    paroxetine (PAXIL) 40 MG tablet Take 1 tablet (40 mg total) by mouth once daily. 90  "tablet 1    risperiDONE (RISPERDAL) 3 MG Tab Take 1 tablet (3 mg total) by mouth 2 (two) times daily. 180 tablet 1    [DISCONTINUED] amLODIPine (NORVASC) 10 MG tablet Take 1 tablet (10 mg total) by mouth once daily. 90 tablet 1    [DISCONTINUED] losartan-hydrochlorothiazide 100-25 mg (HYZAAR) 100-25 mg per tablet Take 1 tablet by mouth once daily. 90 tablet 1    [DISCONTINUED] metFORMIN (GLUCOPHAGE) 500 MG tablet Take 1 tablet (500 mg total) by mouth daily with breakfast. 90 tablet 1    [DISCONTINUED] metoprolol succinate (TOPROL-XL) 50 MG 24 hr tablet Take 1 tablet (50 mg total) by mouth once daily. 90 tablet 1    [DISCONTINUED] nystatin (MYCOSTATIN) ointment Apply topically 2 (two) times daily. 30 g 0    [DISCONTINUED] pantoprazole (PROTONIX) 40 MG tablet Take 1 tablet (40 mg total) by mouth once daily. 90 tablet 0    [DISCONTINUED] pantoprazole (PROTONIX) 40 MG tablet TAKE 1 TABLET (40 MG TOTAL) BY MOUTH ONCE DAILY. 90 tablet 1    [DISCONTINUED] potassium chloride 10% (KAYCIEL) 20 mEq/15 mL oral solution Take 20 mEq by mouth once daily.         Allergies  Review of patient's allergies indicates:  No Known Allergies    Physical Examination   /83   Pulse 86   Temp 98.4 °F (36.9 °C)   Ht 5' 8" (1.727 m)   Wt 130.2 kg (287 lb)   SpO2 99%   BMI 43.64 kg/m²    Physical Exam  Vitals and nursing note reviewed.   Constitutional:       Appearance: Normal appearance. He is obese.   HENT:      Head: Normocephalic and atraumatic.      Nose: Nose normal.      Mouth/Throat:      Mouth: Mucous membranes are moist.   Eyes:      Pupils: Pupils are equal, round, and reactive to light.   Cardiovascular:      Rate and Rhythm: Normal rate and regular rhythm.      Pulses: Normal pulses.      Heart sounds: Normal heart sounds.   Pulmonary:      Effort: Pulmonary effort is normal.      Breath sounds: Normal breath sounds.   Abdominal:      General: Abdomen is flat. Bowel sounds are normal.      Palpations: Abdomen is " soft.   Musculoskeletal:         General: Normal range of motion.      Cervical back: Normal range of motion and neck supple.   Skin:     General: Skin is warm and dry.      Capillary Refill: Capillary refill takes less than 2 seconds.   Neurological:      General: No focal deficit present.      Mental Status: He is alert and oriented to person, place, and time. Mental status is at baseline.   Psychiatric:         Mood and Affect: Mood normal.         Behavior: Behavior normal.         Thought Content: Thought content normal.         Judgment: Judgment normal.        Assessment and Plan (including Health Maintenance)      Problem List  Smart Sets  Document Outside HM   :    Plan:     Continue current meds.. Labs today. Encouraged diet/exercise     Keep f/u with [facility]. 3/15/22 q3 months  bed wetting q2 months now better. - using pullups at bedtime occasionally.   otc fiber supplements for constipation  c'scopy in 11/2018.      Watch htn. Wgt up. Work on diet/exercise. goal 20lbs weight loss.     refill meds.  flu - 2022 at pharmacy  covid vaccine - 2021 J&J      See back 3 months .   Diabetic foot exam +2 pulses, dry and callused skin. Sees dr rendon in [place] podiatry. 1/2022 as needed  Send to [facility] for diabetic shoe eval. Never got - 2020 [place] had to pay $139 for them,     optho - [facility] 9/2022  Pcv - 12/2022          There are no preventive care reminders to display for this patient.    Problem List Items Addressed This Visit          Psychiatric    Psychosis       Cardiac/Vascular    Essential hypertension, malignant - Primary    Relevant Medications    amLODIPine (NORVASC) 10 MG tablet    Other hyperlipidemia    Relevant Orders    CBC Auto Differential    Comprehensive Metabolic Panel    Lipid Panel       Renal/    Nocturnal enuresis       Endocrine    BMI 40.0-44.9, adult    Prediabetes    Relevant Orders    CBC Auto Differential    Comprehensive Metabolic Panel    Hemoglobin A1C    Morbid obesity        Orthopedic    Gout    Relevant Orders    CBC Auto Differential    Comprehensive Metabolic Panel    Uric Acid       Health Maintenance Topics with due status: Not Due       Topic Last Completion Date    TETANUS VACCINE 03/27/2023    Hemoglobin A1c (Prediabetes) 01/22/2024    Lipid Panel 01/22/2024       Future Appointments   Date Time Provider Department Center   6/17/2024  2:00 PM AWV NURSE, [facility] FAMILY MEDICINE [facility]   9/25/2024  9:30 AM Georgia Kc DNP [facility]            Signature:  Georgia Kc DNP      [address] [place] [address]    Date of encounter: 3/20/24

## 2024-03-21 LAB
EST. AVERAGE GLUCOSE BLD GHB EST-MCNC: 126 MG/DL
HBA1C MFR BLD HPLC: 6 % (ref 4.5–6.6)

## 2024-03-21 NOTE — PROGRESS NOTES
He really needs to make sure he's taking that potassium liquid every day. He had a big bottle in his bad that was empty. His potassium very low - he needs to be eating bananas every day. If continues to stay low then we may have to stop his hctz fluid pill and switch it out for another one,.

## 2024-06-26 ENCOUNTER — OFFICE VISIT (OUTPATIENT)
Dept: FAMILY MEDICINE | Facility: CLINIC | Age: 34
End: 2024-06-26
Payer: MEDICARE

## 2024-06-26 VITALS
OXYGEN SATURATION: 98 % | HEIGHT: 68 IN | WEIGHT: 287 LBS | DIASTOLIC BLOOD PRESSURE: 99 MMHG | SYSTOLIC BLOOD PRESSURE: 144 MMHG | HEART RATE: 78 BPM | BODY MASS INDEX: 43.5 KG/M2 | RESPIRATION RATE: 19 BRPM

## 2024-06-26 DIAGNOSIS — E66.01 MORBID OBESITY: ICD-10-CM

## 2024-06-26 DIAGNOSIS — E78.49 OTHER HYPERLIPIDEMIA: ICD-10-CM

## 2024-06-26 DIAGNOSIS — R73.03 PREDIABETES: ICD-10-CM

## 2024-06-26 DIAGNOSIS — I10 ESSENTIAL HYPERTENSION, MALIGNANT: ICD-10-CM

## 2024-06-26 DIAGNOSIS — F29 PSYCHOSIS, UNSPECIFIED PSYCHOSIS TYPE: ICD-10-CM

## 2024-06-26 DIAGNOSIS — Z00.00 ENCOUNTER FOR SUBSEQUENT ANNUAL WELLNESS VISIT (AWV) IN MEDICARE PATIENT: Primary | ICD-10-CM

## 2024-06-26 PROCEDURE — G0439 PPPS, SUBSEQ VISIT: HCPCS | Mod: ,,, | Performed by: NURSE PRACTITIONER

## 2024-06-26 NOTE — PATIENT INSTRUCTIONS
Counseling and Referral of Other Preventative  (Italic type indicates deductible and co-insurance are waived)    Patient Name: Maldonado Levy  Today's Date: 6/26/2024    Health Maintenance       Date Due Completion Date    COVID-19 Vaccine (3 - 2023-24 season) 09/20/2024 (Originally 9/1/2023) 7/1/2022    Hemoglobin A1c (Prediabetes) 03/20/2025 3/20/2024    Lipid Panel 03/20/2029 3/20/2024    TETANUS VACCINE 03/27/2033 3/27/2023        No orders of the defined types were placed in this encounter.      The following information is provided to all patients.  This information is to help you find resources for any of the problems found today that may be affecting your health:                  Living healthy guide: [facility]ic[facility].gov    Understanding Diabetes: www.diabetes.org      Eating healthy: www.cdc.gov/healthyweight      [facility] home safety checklist: www.cdc.gov/steadi/patient.html      Agency on Aging: [place]aging.org    Alcoholics anonymous (AA): www.aa.org      Physical Activity: www.ryan.nih.gov/ts6nhve      Tobacco use: alabamapShootitliveealth.gov

## 2024-06-26 NOTE — PROGRESS NOTES
" [facility]       PATIENT NAME: Maldonado Levy   : 1990    AGE: 34 y.o. DATE: 2024   MRN: [id number]        Maldonado Levy presented for a  Medicare AWV and comprehensive Health Risk Assessment today. The following components were reviewed and updated:    Medical history  Family History  Social history  Allergies and Current Medications  Health Risk Assessment  Health Maintenance  Care Team         ** See Completed Assessments for Annual Wellness Visit within the encounter summary.**         The following assessments were completed:  Living Situation  CAGE  Depression Screening  Timed Get Up and Go  Whisper Test  Cognitive Function Screening  Nutrition Screening  ADL Screening  PAQ Screening      Opioid documentation:      Patient does not have a current opioid prescription.        Vitals:    24 1328 24 1400   BP: (!) 138/94 (!) 144/99   BP Location: Left arm Left arm   Patient Position: Sitting Sitting   BP Method: Large (Automatic) X-Large (Automatic)   Pulse: 78    Resp: 19    SpO2: 98%    Weight: 130.2 kg (287 lb)    Height: 5' 8" (1.727 m)      Body mass index is 43.64 kg/m².  Physical Exam  Vitals and nursing note reviewed.   Constitutional:       Appearance: Normal appearance. He is obese.   HENT:      Head: Normocephalic and atraumatic.      Nose: Nose normal.      Mouth/Throat:      Mouth: Mucous membranes are moist.   Eyes:      Pupils: Pupils are equal, round, and reactive to light.   Cardiovascular:      Rate and Rhythm: Normal rate and regular rhythm.      Pulses: Normal pulses.      Heart sounds: Normal heart sounds.   Pulmonary:      Effort: Pulmonary effort is normal.      Breath sounds: Normal breath sounds.   Abdominal:      General: Abdomen is flat. Bowel sounds are normal.      Palpations: Abdomen is soft.   Musculoskeletal:         General: Normal range of motion.      Cervical back: Normal range of " motion and neck supple.   Skin:     General: Skin is warm and dry.      Capillary Refill: Capillary refill takes less than 2 seconds.   Neurological:      General: No focal deficit present.      Mental Status: He is alert and oriented to person, place, and time. Mental status is at baseline.   Psychiatric:         Mood and Affect: Mood normal.         Behavior: Behavior normal.         Thought Content: Thought content normal.         Judgment: Judgment normal.              Diagnoses and health risks identified today and associated recommendations/orders:    1. Encounter for subsequent annual wellness visit (AWV) in Medicare patient  Gave appt reminder for next year's AWV    2. Essential hypertension, malignant  Did not take meds pta toda    3. Psychosis, unspecified psychosis type  Controlled with medications currently followed by psych     4. Other hyperlipidemia  Controlled with medications     5. Prediabetes  Controlled with medications     6. Morbid obesity  Encouraged diet & exercise to decrease weight/BMI    7. BMI 40.0-44.9, adult      Provided Maldonado with a 5-10 year written screening schedule and personal prevention plan. Recommendations were developed using the USPSTF age appropriate recommendations. Education, counseling, and referrals were provided as needed. After Visit Summary printed and given to patient which includes a list of additional screenings\tests needed.    Follow up in about 1 year (around 6/26/2025) for AWV follow-up.    Kika Marcelino RN    I offered to discuss advanced care planning, including how to pick a person who would make decisions for you if you were unable to make them for yourself, called a health care power of , and what kind of decisions you might make such as use of life sustaining treatments such as ventilators and tube feeding when faced with a life limiting illness recorded on a living will that they will need to know. (How you want to be cared for as you  near the end of your natural life)     X Patient is interested in learning more about how to make advanced directives.  I provided them paperwork and offered to discuss this with them.    Signature:

## 2024-07-01 ENCOUNTER — TELEPHONE (OUTPATIENT)
Dept: FAMILY MEDICINE | Facility: CLINIC | Age: 34
End: 2024-07-01
Payer: MEDICARE

## 2024-07-01 NOTE — TELEPHONE ENCOUNTER
----- Message from Georgia Kc DNP sent at 3/21/2024  7:54 AM CDT -----  He really needs to make sure he's taking that potassium liquid every day. He had a big bottle in his bad that was empty. His potassium very low - he needs to be eating bananas every day. If continues to stay low then we may have to stop his hctz flui  d pill and switch it out for another one,.

## 2024-08-09 DIAGNOSIS — Z71.89 COMPLEX CARE COORDINATION: ICD-10-CM

## 2024-08-22 RX ORDER — DICLOFENAC SODIUM 50 MG/1
50 TABLET, DELAYED RELEASE ORAL 2 TIMES DAILY
Qty: 60 TABLET | Refills: 1 | Status: SHIPPED | OUTPATIENT
Start: 2024-08-22

## 2024-09-25 ENCOUNTER — OFFICE VISIT (OUTPATIENT)
Dept: FAMILY MEDICINE | Facility: CLINIC | Age: 34
End: 2024-09-25
Payer: MEDICARE

## 2024-09-25 VITALS
HEIGHT: 68 IN | TEMPERATURE: 98 F | DIASTOLIC BLOOD PRESSURE: 85 MMHG | HEART RATE: 78 BPM | RESPIRATION RATE: 18 BRPM | BODY MASS INDEX: 43.44 KG/M2 | SYSTOLIC BLOOD PRESSURE: 126 MMHG | WEIGHT: 286.63 LBS | OXYGEN SATURATION: 96 %

## 2024-09-25 DIAGNOSIS — I10 ESSENTIAL HYPERTENSION: Primary | ICD-10-CM

## 2024-09-25 DIAGNOSIS — I10 ESSENTIAL HYPERTENSION, MALIGNANT: ICD-10-CM

## 2024-09-25 DIAGNOSIS — R73.03 PREDIABETES: ICD-10-CM

## 2024-09-25 DIAGNOSIS — D64.9 ANEMIA, UNSPECIFIED TYPE: ICD-10-CM

## 2024-09-25 DIAGNOSIS — M10.9 GOUT, UNSPECIFIED CAUSE, UNSPECIFIED CHRONICITY, UNSPECIFIED SITE: ICD-10-CM

## 2024-09-25 DIAGNOSIS — E66.01 MORBID OBESITY: ICD-10-CM

## 2024-09-25 DIAGNOSIS — Z11.1 SCREENING-PULMONARY TB: ICD-10-CM

## 2024-09-25 DIAGNOSIS — Z79.899 OTHER LONG TERM (CURRENT) DRUG THERAPY: ICD-10-CM

## 2024-09-25 DIAGNOSIS — Z13.6 ENCOUNTER FOR SCREENING FOR CARDIOVASCULAR DISORDERS: ICD-10-CM

## 2024-09-25 DIAGNOSIS — F29 PSYCHOSIS, UNSPECIFIED PSYCHOSIS TYPE: ICD-10-CM

## 2024-09-25 DIAGNOSIS — M10.071 ACUTE IDIOPATHIC GOUT OF RIGHT FOOT: ICD-10-CM

## 2024-09-25 DIAGNOSIS — Z23 FLU VACCINE NEED: ICD-10-CM

## 2024-09-25 LAB
25(OH)D3 SERPL-MCNC: 22.4 NG/ML
ALBUMIN SERPL BCP-MCNC: 3.9 G/DL (ref 3.5–5)
ALBUMIN/GLOB SERPL: 0.9 {RATIO}
ALP SERPL-CCNC: 120 U/L (ref 45–115)
ALT SERPL W P-5'-P-CCNC: 52 U/L (ref 16–61)
ANION GAP SERPL CALCULATED.3IONS-SCNC: 8 MMOL/L (ref 7–16)
AST SERPL W P-5'-P-CCNC: 39 U/L (ref 15–37)
BASOPHILS # BLD AUTO: 0.04 K/UL (ref 0–0.2)
BASOPHILS NFR BLD AUTO: 0.5 % (ref 0–1)
BILIRUB SERPL-MCNC: 0.3 MG/DL (ref ?–1.2)
BUN SERPL-MCNC: 14 MG/DL (ref 7–18)
BUN/CREAT SERPL: 11 (ref 6–20)
CALCIUM SERPL-MCNC: 10.3 MG/DL (ref 8.5–10.1)
CHLORIDE SERPL-SCNC: 96 MMOL/L (ref 98–107)
CHOLEST SERPL-MCNC: 128 MG/DL (ref 0–200)
CHOLEST/HDLC SERPL: 3.3 {RATIO}
CO2 SERPL-SCNC: 37 MMOL/L (ref 21–32)
CREAT SERPL-MCNC: 1.33 MG/DL (ref 0.7–1.3)
DIFFERENTIAL METHOD BLD: ABNORMAL
EGFR (NO RACE VARIABLE) (RUSH/TITUS): 71 ML/MIN/1.73M2
EOSINOPHIL # BLD AUTO: 0.34 K/UL (ref 0–0.5)
EOSINOPHIL NFR BLD AUTO: 4.6 % (ref 1–4)
ERYTHROCYTE [DISTWIDTH] IN BLOOD BY AUTOMATED COUNT: 13.6 % (ref 11.5–14.5)
EST. AVERAGE GLUCOSE BLD GHB EST-MCNC: 123 MG/DL
FOLATE SERPL-MCNC: >20 NG/ML (ref 3.1–17.5)
GLOBULIN SER-MCNC: 4.5 G/DL (ref 2–4)
GLUCOSE SERPL-MCNC: 65 MG/DL (ref 74–106)
HBA1C MFR BLD HPLC: 5.9 % (ref 4.5–6.6)
HCT VFR BLD AUTO: 37.5 % (ref 40–54)
HDLC SERPL-MCNC: 39 MG/DL (ref 40–60)
HGB BLD-MCNC: 11.8 G/DL (ref 13.5–18)
IMM GRANULOCYTES # BLD AUTO: 0.02 K/UL (ref 0–0.04)
IMM GRANULOCYTES NFR BLD: 0.3 % (ref 0–0.4)
IRON SATN MFR SERPL: 17 % (ref 14–50)
IRON SERPL-MCNC: 47 ΜG/DL (ref 65–175)
LDLC SERPL CALC-MCNC: 70 MG/DL
LDLC/HDLC SERPL: 1.8 {RATIO}
LYMPHOCYTES # BLD AUTO: 2 K/UL (ref 1–4.8)
LYMPHOCYTES NFR BLD AUTO: 26.8 % (ref 27–41)
MCH RBC QN AUTO: 27.3 PG (ref 27–31)
MCHC RBC AUTO-ENTMCNC: 31.5 G/DL (ref 32–36)
MCV RBC AUTO: 86.8 FL (ref 80–96)
MONOCYTES # BLD AUTO: 0.68 K/UL (ref 0–0.8)
MONOCYTES NFR BLD AUTO: 9.1 % (ref 2–6)
MPC BLD CALC-MCNC: 9.4 FL (ref 9.4–12.4)
NEUTROPHILS # BLD AUTO: 4.38 K/UL (ref 1.8–7.7)
NEUTROPHILS NFR BLD AUTO: 58.7 % (ref 53–65)
NONHDLC SERPL-MCNC: 89 MG/DL
NRBC # BLD AUTO: 0 X10E3/UL
NRBC, AUTO (.00): 0 %
PLATELET # BLD AUTO: 369 K/UL (ref 150–400)
POTASSIUM SERPL-SCNC: 2.9 MMOL/L (ref 3.5–5.1)
PROLACTIN SERPL-MCNC: 38.1 NG/ML
PROT SERPL-MCNC: 8.4 G/DL (ref 6.4–8.2)
RBC # BLD AUTO: 4.32 M/UL (ref 4.6–6.2)
SODIUM SERPL-SCNC: 138 MMOL/L (ref 136–145)
T4 FREE SERPL-MCNC: 1.08 NG/DL (ref 0.76–1.46)
TIBC SERPL-MCNC: 280 ΜG/DL (ref 250–450)
TRIGL SERPL-MCNC: 97 MG/DL (ref 35–150)
TSH SERPL DL<=0.005 MIU/L-ACNC: 1.95 UIU/ML (ref 0.36–3.74)
URATE SERPL-MCNC: 5.8 MG/DL (ref 3.5–7.2)
VIT B12 SERPL-MCNC: 308 PG/ML (ref 193–986)
VLDLC SERPL-MCNC: 19 MG/DL
WBC # BLD AUTO: 7.46 K/UL (ref 4.5–11)

## 2024-09-25 PROCEDURE — G0008 ADMIN INFLUENZA VIRUS VAC: HCPCS | Mod: ,,, | Performed by: NURSE PRACTITIONER

## 2024-09-25 PROCEDURE — 82306 VITAMIN D 25 HYDROXY: CPT | Mod: ,,, | Performed by: CLINICAL MEDICAL LABORATORY

## 2024-09-25 PROCEDURE — 90656 IIV3 VACC NO PRSV 0.5 ML IM: CPT | Mod: ,,, | Performed by: NURSE PRACTITIONER

## 2024-09-25 PROCEDURE — 84146 ASSAY OF PROLACTIN: CPT | Mod: ,,, | Performed by: CLINICAL MEDICAL LABORATORY

## 2024-09-25 PROCEDURE — 83550 IRON BINDING TEST: CPT | Mod: ,,, | Performed by: CLINICAL MEDICAL LABORATORY

## 2024-09-25 PROCEDURE — 82607 VITAMIN B-12: CPT | Mod: ,,, | Performed by: CLINICAL MEDICAL LABORATORY

## 2024-09-25 PROCEDURE — 80053 COMPREHEN METABOLIC PANEL: CPT | Mod: ,,, | Performed by: CLINICAL MEDICAL LABORATORY

## 2024-09-25 PROCEDURE — 83540 ASSAY OF IRON: CPT | Mod: ,,, | Performed by: CLINICAL MEDICAL LABORATORY

## 2024-09-25 PROCEDURE — 84439 ASSAY OF FREE THYROXINE: CPT | Mod: ,,, | Performed by: CLINICAL MEDICAL LABORATORY

## 2024-09-25 PROCEDURE — 85025 COMPLETE CBC W/AUTO DIFF WBC: CPT | Mod: ,,, | Performed by: CLINICAL MEDICAL LABORATORY

## 2024-09-25 PROCEDURE — 80061 LIPID PANEL: CPT | Mod: ,,, | Performed by: CLINICAL MEDICAL LABORATORY

## 2024-09-25 PROCEDURE — 83036 HEMOGLOBIN GLYCOSYLATED A1C: CPT | Mod: ,,, | Performed by: CLINICAL MEDICAL LABORATORY

## 2024-09-25 PROCEDURE — 84550 ASSAY OF BLOOD/URIC ACID: CPT | Mod: ,,, | Performed by: CLINICAL MEDICAL LABORATORY

## 2024-09-25 PROCEDURE — 82746 ASSAY OF FOLIC ACID SERUM: CPT | Mod: ,,, | Performed by: CLINICAL MEDICAL LABORATORY

## 2024-09-25 PROCEDURE — 84443 ASSAY THYROID STIM HORMONE: CPT | Mod: ,,, | Performed by: CLINICAL MEDICAL LABORATORY

## 2024-09-25 PROCEDURE — 99214 OFFICE O/P EST MOD 30 MIN: CPT | Mod: ,,, | Performed by: NURSE PRACTITIONER

## 2024-09-25 RX ORDER — METOPROLOL SUCCINATE 50 MG/1
50 TABLET, EXTENDED RELEASE ORAL DAILY
Qty: 90 TABLET | Refills: 1 | Status: SHIPPED | OUTPATIENT
Start: 2024-09-25

## 2024-09-25 RX ORDER — BUSPIRONE HYDROCHLORIDE 15 MG/1
15 TABLET ORAL 2 TIMES DAILY
Qty: 180 TABLET | Refills: 1 | Status: SHIPPED | OUTPATIENT
Start: 2024-09-25

## 2024-09-25 RX ORDER — ALLOPURINOL 300 MG/1
300 TABLET ORAL DAILY
Qty: 90 TABLET | Refills: 3 | Status: SHIPPED | OUTPATIENT
Start: 2024-09-25

## 2024-09-25 RX ORDER — LOSARTAN POTASSIUM AND HYDROCHLOROTHIAZIDE 25; 100 MG/1; MG/1
1 TABLET ORAL DAILY
Qty: 90 TABLET | Refills: 1 | Status: SHIPPED | OUTPATIENT
Start: 2024-09-25 | End: 2024-09-26

## 2024-09-25 RX ORDER — RISPERIDONE 3 MG/1
3 TABLET ORAL 2 TIMES DAILY
Qty: 180 TABLET | Refills: 1 | Status: SHIPPED | OUTPATIENT
Start: 2024-09-25

## 2024-09-25 RX ORDER — POTASSIUM CHLORIDE 20 MEQ/15ML
20 SOLUTION ORAL DAILY
Qty: 1350 ML | Refills: 1 | Status: SHIPPED | OUTPATIENT
Start: 2024-09-25

## 2024-09-25 RX ORDER — PANTOPRAZOLE SODIUM 40 MG/1
40 TABLET, DELAYED RELEASE ORAL DAILY
Qty: 90 TABLET | Refills: 1 | Status: SHIPPED | OUTPATIENT
Start: 2024-09-25

## 2024-09-25 RX ORDER — METFORMIN HYDROCHLORIDE 500 MG/1
500 TABLET ORAL
Qty: 90 TABLET | Refills: 1 | Status: SHIPPED | OUTPATIENT
Start: 2024-09-25

## 2024-09-25 RX ORDER — ATORVASTATIN CALCIUM 10 MG/1
10 TABLET, FILM COATED ORAL DAILY
Qty: 90 TABLET | Refills: 0 | Status: SHIPPED | OUTPATIENT
Start: 2024-09-25

## 2024-09-25 RX ORDER — AMLODIPINE BESYLATE 10 MG/1
10 TABLET ORAL DAILY
Qty: 90 TABLET | Refills: 1 | Status: SHIPPED | OUTPATIENT
Start: 2024-09-25 | End: 2025-09-25

## 2024-09-25 RX ORDER — PAROXETINE HYDROCHLORIDE 40 MG/1
40 TABLET, FILM COATED ORAL DAILY
Qty: 90 TABLET | Refills: 1 | Status: SHIPPED | OUTPATIENT
Start: 2024-09-25

## 2024-09-25 NOTE — PROGRESS NOTES
Georgia Kc DNP   [address] [place] [address]     PATIENT NAME: Maldonado Levy  : 1990  DATE: 24  MRN: [id number]      Billing Provider: Georgia Kc DNP  Level of Service:   Patient PCP Information       Provider PCP Type    Georgia Kc DNP General            Reason for Visit / Chief Complaint: Follow-up       Update PCP  Update Chief Complaint         History of Present Illness / Problem Focused Workflow     Maldonado Levy presents to the clinic with Follow-up     HPI    Review of Systems     Review of Systems     Medical / Social / Family History     Past Medical History:   Diagnosis Date    Diabetes mellitus, type 2     Gout, unspecified     Hyperlipidemia     Hypertension        History reviewed. No pertinent surgical history.    Social History    reports that he has never smoked. He has never been exposed to tobacco smoke. He has never used smokeless tobacco. He reports that he does not drink alcohol and does not use drugs.    Family History  's family history includes Arthritis in his father and mother; Hypertension in his father and mother; Sleep apnea in his father.    Medications and Allergies     Medications  Outpatient Medications Marked as Taking for the 24 encounter (Office Visit) with Georgia Kc DNP   Medication Sig Dispense Refill    allopurinoL (ZYLOPRIM) 300 MG tablet Take 1 tablet (300 mg total) by mouth once daily. 90 tablet 3    amLODIPine (NORVASC) 10 MG tablet Take 1 tablet (10 mg total) by mouth once daily. 90 tablet 1    atorvastatin (LIPITOR) 10 MG tablet Take 1 tablet (10 mg total) by mouth once daily. 90 tablet 0    busPIRone (BUSPAR) 15 MG tablet Take 1 tablet (15 mg total) by mouth 2 (two) times daily. 180 tablet 1    diclofenac (VOLTAREN) 50 MG EC tablet TAKE 1 TABLET (50 MG TOTAL) BY MOUTH 2 (TWO) TIMES DAILY. 60 tablet 1    diphenhydrAMINE (BENADRYL) 50 MG capsule Take 50 mg by mouth every 6 (six) hours as needed  "for Itching.      iron fum,ps-FA-vit B,C#18-Lact (FUSION PLUS) 130 mg iron -1,250 mcg Cap Take 1 capsule by mouth once daily. 90 capsule 3    losartan-hydrochlorothiazide 100-25 mg (HYZAAR) 100-25 mg per tablet Take 1 tablet by mouth once daily. 90 tablet 1    metFORMIN (GLUCOPHAGE) 500 MG tablet Take 1 tablet (500 mg total) by mouth daily with breakfast. 90 tablet 1    metoprolol succinate (TOPROL-XL) 50 MG 24 hr tablet Take 1 tablet (50 mg total) by mouth once daily. 90 tablet 1    pantoprazole (PROTONIX) 40 MG tablet Take 1 tablet (40 mg total) by mouth once daily. 90 tablet 1    paroxetine (PAXIL) 40 MG tablet Take 1 tablet (40 mg total) by mouth once daily. 90 tablet 1    potassium chloride 10% (KAYCIEL) 20 mEq/15 mL oral solution Take 15 mLs (20 mEq total) by mouth once daily. 1350 mL 1    risperiDONE (RISPERDAL) 3 MG Tab Take 1 tablet (3 mg total) by mouth 2 (two) times daily. 180 tablet 1     Current Facility-Administered Medications for the 9/25/24 encounter (Office Visit) with Georgia Kc DNP   Medication Dose Route Frequency Provider Last Rate Last Admin    [COMPLETED] influenza (Flulaval, Fluzone, Fluarix) 45 mcg/0.5 mL IM vaccine (> or = 6 mo) 0.5 mL  0.5 mL Intramuscular 1 time in Clinic/HOD Georgia Kc DNP   0.5 mL at 09/25/24 0936       Allergies  Review of patient's allergies indicates:  No Known Allergies    Physical Examination   /85 (BP Location: Left arm, Patient Position: Sitting, BP Method: Large (Automatic))   Pulse 78   Temp 98.3 °F (36.8 °C) (Oral)   Resp 18   Ht 5' 8" (1.727 m)   Wt 130 kg (286 lb 9.6 oz)   SpO2 96%   BMI 43.58 kg/m²    Physical Exam  Vitals and nursing note reviewed.   Constitutional:       Appearance: Normal appearance. He is obese.   HENT:      Head: Normocephalic and atraumatic.      Nose: Nose normal.      Mouth/Throat:      Mouth: Mucous membranes are moist.   Eyes:      Pupils: Pupils are equal, round, and reactive to light. "   Cardiovascular:      Rate and Rhythm: Normal rate and regular rhythm.      Pulses: Normal pulses.      Heart sounds: Normal heart sounds.   Pulmonary:      Effort: Pulmonary effort is normal.      Breath sounds: Normal breath sounds.   Abdominal:      General: Abdomen is flat. Bowel sounds are normal.      Palpations: Abdomen is soft.   Musculoskeletal:         General: Normal range of motion.      Cervical back: Normal range of motion and neck supple.   Skin:     General: Skin is warm and dry.      Capillary Refill: Capillary refill takes less than 2 seconds.   Neurological:      General: No focal deficit present.      Mental Status: He is alert and oriented to person, place, and time. Mental status is at baseline.   Psychiatric:         Mood and Affect: Mood normal.         Behavior: Behavior normal.         Thought Content: Thought content normal.         Judgment: Judgment normal.          Assessment and Plan (including Health Maintenance)      Problem List  Smart Sets  Document Outside HM   :    Plan:     Continue current meds.. Labs today. Encouraged diet/exercise     Keep f/u with [facility]. q3 months  bed wetting q2 months now better. - using pullups at bedtime occasionally.   otc fiber supplements for constipation  c'scopy in 11/2018.      Watch htn. Wgt up. Work on diet/exercise. goal 20lbs weight loss.     refill meds.  flu - 2024 today   covid vaccine - 2021 J&J      See back 3 months .   Diabetic foot exam +2 pulses, dry and callused skin. Sees dr rendon in [place] pod[facility]. 1/2022 as needed  Send to [facility] for diabetic shoe eval. Never got - 2020 [place] had to pay $139 for them,     optho - [facility] 9/2022  Pcv - 12/2022    Health Maintenance Due   Topic Date Due    COVID-19 Vaccine (3 - 2023-24 season) 09/01/2024       Problem List Items Addressed This Visit    None  Visit Diagnoses       Flu vaccine need    -  Primary    Relevant Medications    influenza (Flulaval, Fluzone, Fluarix) 45 mcg/0.5 mL IM  vaccine (> or = 6 mo) 0.5 mL (Completed)            Health Maintenance Topics with due status: Not Due       Topic Last Completion Date    TETANUS VACCINE 03/27/2023    Hemoglobin A1c (Prediabetes) 03/20/2024    Lipid Panel 03/20/2024       Future Appointments   Date Time Provider Department Center   7/1/2025  1:00 PM AWROSEMARIE NURSEURMILA [facility] LAMBERTO Cunningham            Signature:  Georgia Kc, SCOTTY      [address] [place] [address]    Date of encounter: 9/25/24

## 2024-09-26 RX ORDER — LOSARTAN POTASSIUM 100 MG/1
100 TABLET ORAL DAILY
Qty: 90 TABLET | Refills: 1 | Status: SHIPPED | OUTPATIENT
Start: 2024-09-26 | End: 2025-09-26

## 2024-09-26 NOTE — TELEPHONE ENCOUNTER
Pt is on losartan HCTZ   Informed mom to stop this and we could send over just the losartan pill

## 2025-01-16 ENCOUNTER — TELEPHONE (OUTPATIENT)
Dept: FAMILY MEDICINE | Facility: CLINIC | Age: 35
End: 2025-01-16
Payer: MEDICARE

## 2025-01-16 NOTE — TELEPHONE ENCOUNTER
----- Message from Tech Laturina sent at 1/16/2025  1:42 PM CST -----  Who Called: RODRIGUEZ (mom)     Caller is requesting assistance/information from provider's office.        Preferred Method of Contact: Phone Call  Patient's Preferred Phone Number on File: [phone]   Best Call Back Number, if different:  Additional Information: patient needs lab work done, mom wanted to know what is a good date and time to bring him.

## 2025-01-16 NOTE — TELEPHONE ENCOUNTER
Patient needs labs done ordered by other MD   Informed mom to come any day from 08-11 or 1-4 except Thursday afternoons(no ) or Friday afternoon (we are closed )

## 2025-02-03 DIAGNOSIS — D64.9 ANEMIA, UNSPECIFIED TYPE: ICD-10-CM

## 2025-02-03 RX ORDER — IRON,FM,PS/FOLIC/B,C18/L.CASEI 130-1.25MG
1 CAPSULE ORAL DAILY
Qty: 30 CAPSULE | Refills: 3 | Status: SHIPPED | OUTPATIENT
Start: 2025-02-03

## 2025-02-11 RX ORDER — DICLOFENAC SODIUM 50 MG/1
50 TABLET, DELAYED RELEASE ORAL 2 TIMES DAILY
Qty: 60 TABLET | Refills: 1 | Status: SHIPPED | OUTPATIENT
Start: 2025-02-11

## 2025-02-24 RX ORDER — ATORVASTATIN CALCIUM 10 MG/1
TABLET, FILM COATED ORAL
Qty: 90 TABLET | Refills: 0 | Status: SHIPPED | OUTPATIENT
Start: 2025-02-24

## 2025-04-02 ENCOUNTER — OFFICE VISIT (OUTPATIENT)
Dept: FAMILY MEDICINE | Facility: CLINIC | Age: 35
End: 2025-04-02
Payer: MEDICARE

## 2025-04-02 VITALS
DIASTOLIC BLOOD PRESSURE: 86 MMHG | HEIGHT: 69 IN | HEART RATE: 72 BPM | WEIGHT: 284.81 LBS | TEMPERATURE: 98 F | SYSTOLIC BLOOD PRESSURE: 136 MMHG | BODY MASS INDEX: 42.18 KG/M2 | OXYGEN SATURATION: 96 %

## 2025-04-02 DIAGNOSIS — E78.49 OTHER HYPERLIPIDEMIA: ICD-10-CM

## 2025-04-02 DIAGNOSIS — G47.8 AWAKENS FROM SLEEP AT NIGHT: ICD-10-CM

## 2025-04-02 DIAGNOSIS — E66.01 MORBID OBESITY: ICD-10-CM

## 2025-04-02 DIAGNOSIS — N39.44 NOCTURNAL ENURESIS: ICD-10-CM

## 2025-04-02 DIAGNOSIS — M10.9 GOUT, UNSPECIFIED CAUSE, UNSPECIFIED CHRONICITY, UNSPECIFIED SITE: ICD-10-CM

## 2025-04-02 DIAGNOSIS — Z79.899 OTHER LONG TERM (CURRENT) DRUG THERAPY: ICD-10-CM

## 2025-04-02 DIAGNOSIS — I10 ESSENTIAL HYPERTENSION, MALIGNANT: ICD-10-CM

## 2025-04-02 DIAGNOSIS — R06.02 SOB (SHORTNESS OF BREATH): ICD-10-CM

## 2025-04-02 DIAGNOSIS — R06.83 SNORING: ICD-10-CM

## 2025-04-02 DIAGNOSIS — D64.9 ANEMIA, UNSPECIFIED TYPE: ICD-10-CM

## 2025-04-02 DIAGNOSIS — F29 PSYCHOSIS, UNSPECIFIED PSYCHOSIS TYPE: ICD-10-CM

## 2025-04-02 DIAGNOSIS — I10 ESSENTIAL HYPERTENSION: ICD-10-CM

## 2025-04-02 DIAGNOSIS — R06.89 HEAVY BREATHING: ICD-10-CM

## 2025-04-02 DIAGNOSIS — R73.03 PREDIABETES: Primary | ICD-10-CM

## 2025-04-02 DIAGNOSIS — E11.9 TYPE 2 DIABETES MELLITUS WITHOUT COMPLICATION, WITHOUT LONG-TERM CURRENT USE OF INSULIN: ICD-10-CM

## 2025-04-02 PROBLEM — R50.9 FEVER: Status: RESOLVED | Noted: 2021-12-22 | Resolved: 2025-04-02

## 2025-04-02 PROBLEM — R11.10 VOMITING: Status: RESOLVED | Noted: 2021-12-22 | Resolved: 2025-04-02

## 2025-04-02 PROBLEM — R73.9 HYPERGLYCEMIA: Status: RESOLVED | Noted: 2021-09-01 | Resolved: 2025-04-02

## 2025-04-02 PROBLEM — M10.071 ACUTE IDIOPATHIC GOUT OF RIGHT FOOT: Status: RESOLVED | Noted: 2021-09-01 | Resolved: 2025-04-02

## 2025-04-02 PROBLEM — R19.7 DIARRHEA: Status: RESOLVED | Noted: 2021-12-22 | Resolved: 2025-04-02

## 2025-04-02 PROBLEM — Z11.59 SCREENING FOR VIRAL DISEASE: Status: RESOLVED | Noted: 2022-03-16 | Resolved: 2025-04-02

## 2025-04-02 PROBLEM — Z13.31 POSITIVE DEPRESSION SCREENING: Status: RESOLVED | Noted: 2022-06-08 | Resolved: 2025-04-02

## 2025-04-02 PROBLEM — R05.9 COUGH: Status: RESOLVED | Noted: 2022-01-10 | Resolved: 2025-04-02

## 2025-04-02 PROBLEM — A08.4 VIRAL GASTROENTERITIS: Status: RESOLVED | Noted: 2021-12-22 | Resolved: 2025-04-02

## 2025-04-02 PROBLEM — E66.3 OVERWEIGHT: Status: RESOLVED | Noted: 2022-06-08 | Resolved: 2025-04-02

## 2025-04-02 PROBLEM — Z11.1 SCREENING-PULMONARY TB: Status: RESOLVED | Noted: 2024-09-25 | Resolved: 2025-04-02

## 2025-04-02 PROBLEM — Z13.6 ENCOUNTER FOR SCREENING FOR CARDIOVASCULAR DISORDERS: Status: RESOLVED | Noted: 2022-06-08 | Resolved: 2025-04-02

## 2025-04-02 PROBLEM — J06.9 VIRAL UPPER RESPIRATORY TRACT INFECTION: Status: RESOLVED | Noted: 2022-01-10 | Resolved: 2025-04-02

## 2025-04-02 LAB
ALBUMIN SERPL BCP-MCNC: 3.9 G/DL (ref 3.5–5)
ALBUMIN/GLOB SERPL: 0.9 {RATIO}
ALP SERPL-CCNC: 115 U/L (ref 40–150)
ALT SERPL W P-5'-P-CCNC: 37 U/L
ANION GAP SERPL CALCULATED.3IONS-SCNC: 12 MMOL/L (ref 7–16)
AST SERPL W P-5'-P-CCNC: 91 U/L (ref 11–45)
BASOPHILS # BLD AUTO: 0.03 K/UL (ref 0–0.2)
BASOPHILS NFR BLD AUTO: 0.3 % (ref 0–1)
BILIRUB SERPL-MCNC: 0.3 MG/DL
BUN SERPL-MCNC: 14 MG/DL (ref 9–21)
BUN/CREAT SERPL: 11 (ref 6–20)
CALCIUM SERPL-MCNC: 9.7 MG/DL (ref 8.4–10.2)
CHLORIDE SERPL-SCNC: 98 MMOL/L (ref 98–107)
CO2 SERPL-SCNC: 32 MMOL/L (ref 22–29)
CREAT SERPL-MCNC: 1.27 MG/DL (ref 0.72–1.25)
DIFFERENTIAL METHOD BLD: ABNORMAL
EGFR (NO RACE VARIABLE) (RUSH/TITUS): 75 ML/MIN/1.73M2
EOSINOPHIL # BLD AUTO: 0.29 K/UL (ref 0–0.5)
EOSINOPHIL NFR BLD AUTO: 3.3 % (ref 1–4)
ERYTHROCYTE [DISTWIDTH] IN BLOOD BY AUTOMATED COUNT: 13.9 % (ref 11.5–14.5)
GLOBULIN SER-MCNC: 4.5 G/DL (ref 2–4)
GLUCOSE SERPL-MCNC: 79 MG/DL (ref 74–100)
HCT VFR BLD AUTO: 37.6 % (ref 40–54)
HGB BLD-MCNC: 12 G/DL (ref 13.5–18)
IMM GRANULOCYTES # BLD AUTO: 0.03 K/UL (ref 0–0.04)
IMM GRANULOCYTES NFR BLD: 0.3 % (ref 0–0.4)
IRON SATN MFR SERPL: 19 % (ref 20–50)
IRON SERPL-MCNC: 49 UG/DL (ref 65–175)
LYMPHOCYTES # BLD AUTO: 2.34 K/UL (ref 1–4.8)
LYMPHOCYTES NFR BLD AUTO: 26.2 % (ref 27–41)
MCH RBC QN AUTO: 27.3 PG (ref 27–31)
MCHC RBC AUTO-ENTMCNC: 31.9 G/DL (ref 32–36)
MCV RBC AUTO: 85.6 FL (ref 80–96)
MONOCYTES # BLD AUTO: 0.75 K/UL (ref 0–0.8)
MONOCYTES NFR BLD AUTO: 8.4 % (ref 2–6)
MPC BLD CALC-MCNC: 9.6 FL (ref 9.4–12.4)
NEUTROPHILS # BLD AUTO: 5.48 K/UL (ref 1.8–7.7)
NEUTROPHILS NFR BLD AUTO: 61.5 % (ref 53–65)
NRBC # BLD AUTO: 0 X10E3/UL
NRBC, AUTO (.00): 0 %
PLATELET # BLD AUTO: 369 K/UL (ref 150–400)
POTASSIUM SERPL-SCNC: 3.3 MMOL/L (ref 3.5–5.1)
PROT SERPL-MCNC: 8.4 G/DL (ref 6.4–8.3)
RBC # BLD AUTO: 4.39 M/UL (ref 4.6–6.2)
SODIUM SERPL-SCNC: 139 MMOL/L (ref 136–145)
TIBC SERPL-MCNC: 210 UG/DL (ref 69–240)
TIBC SERPL-MCNC: 259 UG/DL (ref 250–450)
TRANSFERRIN SERPL-MCNC: 229 MG/DL (ref 174–364)
WBC # BLD AUTO: 8.92 K/UL (ref 4.5–11)

## 2025-04-02 PROCEDURE — 83540 ASSAY OF IRON: CPT | Mod: ,,, | Performed by: CLINICAL MEDICAL LABORATORY

## 2025-04-02 PROCEDURE — 99214 OFFICE O/P EST MOD 30 MIN: CPT | Mod: ,,, | Performed by: NURSE PRACTITIONER

## 2025-04-02 PROCEDURE — 83550 IRON BINDING TEST: CPT | Mod: ,,, | Performed by: CLINICAL MEDICAL LABORATORY

## 2025-04-02 PROCEDURE — 85025 COMPLETE CBC W/AUTO DIFF WBC: CPT | Mod: ,,, | Performed by: CLINICAL MEDICAL LABORATORY

## 2025-04-02 PROCEDURE — 80053 COMPREHEN METABOLIC PANEL: CPT | Mod: ,,, | Performed by: CLINICAL MEDICAL LABORATORY

## 2025-04-02 RX ORDER — PANTOPRAZOLE SODIUM 40 MG/1
40 TABLET, DELAYED RELEASE ORAL DAILY
Qty: 90 TABLET | Refills: 1 | Status: SHIPPED | OUTPATIENT
Start: 2025-04-02

## 2025-04-02 RX ORDER — BUSPIRONE HYDROCHLORIDE 15 MG/1
15 TABLET ORAL 2 TIMES DAILY
Qty: 180 TABLET | Refills: 1 | Status: SHIPPED | OUTPATIENT
Start: 2025-04-02

## 2025-04-02 RX ORDER — LOSARTAN POTASSIUM 100 MG/1
100 TABLET ORAL DAILY
Qty: 90 TABLET | Refills: 1 | Status: SHIPPED | OUTPATIENT
Start: 2025-04-02 | End: 2026-04-02

## 2025-04-02 RX ORDER — AMLODIPINE BESYLATE 10 MG/1
10 TABLET ORAL DAILY
Qty: 90 TABLET | Refills: 1 | Status: SHIPPED | OUTPATIENT
Start: 2025-04-02 | End: 2026-04-02

## 2025-04-02 RX ORDER — METOPROLOL SUCCINATE 50 MG/1
50 TABLET, EXTENDED RELEASE ORAL DAILY
Qty: 90 TABLET | Refills: 1 | Status: SHIPPED | OUTPATIENT
Start: 2025-04-02

## 2025-04-02 RX ORDER — METFORMIN HYDROCHLORIDE 500 MG/1
500 TABLET ORAL
Qty: 90 TABLET | Refills: 1 | Status: SHIPPED | OUTPATIENT
Start: 2025-04-02

## 2025-04-02 RX ORDER — CLONIDINE HYDROCHLORIDE 0.1 MG/1
0.1 TABLET ORAL NIGHTLY
Qty: 90 TABLET | Refills: 1 | Status: SHIPPED | OUTPATIENT
Start: 2025-04-02 | End: 2026-04-02

## 2025-04-02 RX ORDER — ATORVASTATIN CALCIUM 20 MG/1
20 TABLET, FILM COATED ORAL DAILY
Qty: 90 TABLET | Refills: 3 | Status: SHIPPED | OUTPATIENT
Start: 2025-04-02

## 2025-04-02 NOTE — PROGRESS NOTES
Georgia Kc DNP   [address] [place] [address]     PATIENT NAME: Maldonado Levy  : 1990  DATE: 25  MRN: [id number]      Billing Provider: Georgia Kc DNP  Level of Service: ME OFFICE/OUTPT VISIT, EST, LEVL IV, 30-39 MIN  Patient PCP Information       Provider PCP Type    Georgia Kc DNP General            Reason for Visit / Chief Complaint: Follow-up, Diabetes, Hypertension, and Health Maintenance (..COVID-19 Vaccine(3 - 2024-25 season) due on )       Update PCP  Update Chief Complaint         History of Present Illness / Problem Focused Workflow     Maldonado Levy presents to the clinic with Follow-up, Diabetes, Hypertension, and Health Maintenance (..COVID-19 Vaccine(3 - 2024-25 season) due on )     HPI    Review of Systems     Review of Systems     Medical / Social / Family History     Past Medical History:   Diagnosis Date    Diabetes mellitus, type 2     Gout, unspecified     Hyperlipidemia     Hypertension        History reviewed. No pertinent surgical history.    Social History    reports that he has never smoked. He has never been exposed to tobacco smoke. He has never used smokeless tobacco. He reports that he does not drink alcohol and does not use drugs.    Family History  's family history includes Arthritis in his father and mother; Hypertension in his father and mother; Sleep apnea in his father.    Medications and Allergies     Medications  Outpatient Medications Marked as Taking for the 25 encounter (Office Visit) with Georgia Kc DNP   Medication Sig Dispense Refill    allopurinoL (ZYLOPRIM) 300 MG tablet Take 1 tablet (300 mg total) by mouth once daily. 90 tablet 3    diclofenac (VOLTAREN) 50 MG EC tablet TAKE 1 TABLET (50 MG TOTAL) BY MOUTH 2 (TWO) TIMES DAILY. 60 tablet 1    diphenhydrAMINE (BENADRYL) 50 MG capsule Take 50 mg by mouth every 6 (six) hours as needed for Itching.      FUSION PLUS 130 mg  "iron -1,250 mcg Cap TAKE 1 CAPSULE BY MOUTH ONCE DAILY. 30 capsule 3    paroxetine (PAXIL) 40 MG tablet Take 1 tablet (40 mg total) by mouth once daily. 90 tablet 1    potassium chloride 10% (KAYCIEL) 20 mEq/15 mL oral solution Take 15 mLs (20 mEq total) by mouth once daily. 1350 mL 1    risperiDONE (RISPERDAL) 3 MG Tab Take 1 tablet (3 mg total) by mouth 2 (two) times daily. 180 tablet 1    [DISCONTINUED] amLODIPine (NORVASC) 10 MG tablet Take 1 tablet (10 mg total) by mouth once daily. 90 tablet 1    [DISCONTINUED] atorvastatin (LIPITOR) 10 MG tablet TAKE 1 TABLET (10 MG TOTAL) BY MOUTH ONCE DAILY AT BEDTIME 90 tablet 0    [DISCONTINUED] busPIRone (BUSPAR) 15 MG tablet Take 1 tablet (15 mg total) by mouth 2 (two) times daily. 180 tablet 1    [DISCONTINUED] losartan (COZAAR) 100 MG tablet Take 1 tablet (100 mg total) by mouth once daily. 90 tablet 1    [DISCONTINUED] metFORMIN (GLUCOPHAGE) 500 MG tablet Take 1 tablet (500 mg total) by mouth daily with breakfast. 90 tablet 1    [DISCONTINUED] metoprolol succinate (TOPROL-XL) 50 MG 24 hr tablet Take 1 tablet (50 mg total) by mouth once daily. 90 tablet 1    [DISCONTINUED] pantoprazole (PROTONIX) 40 MG tablet Take 1 tablet (40 mg total) by mouth once daily. 90 tablet 1       Allergies  Review of patient's allergies indicates:  No Known Allergies    Physical Examination   /86   Pulse 72   Temp 97.8 °F (36.6 °C) (Oral)   Ht 5' 8.75" (1.746 m)   Wt 129.2 kg (284 lb 12.8 oz)   SpO2 96%   BMI 42.36 kg/m²    Physical Exam  Vitals and nursing note reviewed.   Constitutional:       Appearance: Normal appearance. He is obese.   HENT:      Head: Normocephalic and atraumatic.      Nose: Nose normal.      Mouth/Throat:      Mouth: Mucous membranes are moist.   Eyes:      Extraocular Movements: Extraocular movements intact.      Conjunctiva/sclera: Conjunctivae normal.      Pupils: Pupils are equal, round, and reactive to light.   Cardiovascular:      Rate " and Rhythm: Normal rate and regular rhythm.      Pulses: Normal pulses.      Heart sounds: Normal heart sounds.   Pulmonary:      Effort: Pulmonary effort is normal.      Breath sounds: Normal breath sounds.   Abdominal:      General: Abdomen is flat. Bowel sounds are normal. There is distension.      Palpations: Abdomen is soft.   Musculoskeletal:         General: Normal range of motion.      Cervical back: Normal range of motion and neck supple.   Skin:     General: Skin is warm and dry.      Capillary Refill: Capillary refill takes less than 2 seconds.   Neurological:      General: No focal deficit present.      Mental Status: He is alert and oriented to person, place, and time. Mental status is at baseline.   Psychiatric:         Mood and Affect: Mood normal.         Behavior: Behavior normal.         Thought Content: Thought content normal.         Judgment: Judgment normal.        Assessment and Plan (including Health Maintenance)      Problem List  Smart Sets  Document Outside HM   :    Plan:       Continue current meds.. Labs today. Encouraged diet/exercise     Keep f/u with [facility]. q3 months  bed wetting q2 months now better. - using pullups at bedtime occasionally.   otc fiber supplements for constipation  c'scopy in 11/2018.      Watch htn. Wgt up. Work on diet/exercise. goal 20lbs weight loss.     refill meds.  flu - 2024 today   covid vaccine - 2021 J&J      See back 3 months .   Diabetic foot exam +2 pulses, dry and callused skin. Sees dr rendon in [place] pod[facility]. 1/2022 as needed  Send to [facility] for diabetic shoe eval. Never got - 2020 [place] had to pay $139 for them,     optho - [facility] 9/2022  Pcv - 12/2022       There are no preventive care reminders to display for this patient.      Problem List Items Addressed This Visit          Psychiatric    Psychosis       Pulmonary    Heavy breathing    Relevant Orders    Ambulatory referral/consult to Sleep Disorders    SOB (shortness of breath)     Relevant Orders    Ambulatory referral/consult to Sleep Disorders       Cardiac/Vascular    Essential hypertension, malignant    Relevant Medications    amLODIPine (NORVASC) 10 MG tablet    Other hyperlipidemia       Renal/    Nocturnal enuresis       Oncology    Anemia    Relevant Orders    Iron and TIBC       Endocrine    BMI 40.0-44.9, adult    Morbid obesity    Prediabetes - Primary    Type 2 diabetes mellitus without complication, without long-term current use of insulin    Relevant Medications    metFORMIN (GLUCOPHAGE) 500 MG tablet       Orthopedic    Gout       Palliative Care    Other long term (current) drug therapy       Other    Awakens from sleep at night    Snoring    Relevant Orders    Ambulatory referral/consult to Sleep Disorders       Health Maintenance Topics with due status: Not Due       Topic Last Completion Date    TETANUS VACCINE 03/27/2023    Hemoglobin A1c (Prediabetes) 01/27/2025    Lipid Panel 01/27/2025    RSV Vaccine (Age 60+ and Pregnant patients) Not Due       Future Appointments   Date Time Provider Department Center   7/1/2025  1:00 PM AWV NURSE, [facility] MEDICINE [facility] LAMBERTO Cunningham   7/2/2025  9:30 AM Georgia Kc DNP [facility] LAMBERTO Cunningham            Signature:  Georgia Kc DNP      [address] [place] [address]    Date of encounter: 4/2/25

## 2025-04-03 ENCOUNTER — RESULTS FOLLOW-UP (OUTPATIENT)
Dept: FAMILY MEDICINE | Facility: CLINIC | Age: 35
End: 2025-04-03
Payer: MEDICARE

## 2025-04-03 DIAGNOSIS — D64.9 ANEMIA, UNSPECIFIED TYPE: ICD-10-CM

## 2025-04-03 RX ORDER — IRON,FM,PS/FOLIC/B,C18/L.CASEI 130-1.25MG
1 CAPSULE ORAL DAILY
Qty: 90 CAPSULE | Refills: 1 | Status: SHIPPED | OUTPATIENT
Start: 2025-04-03

## 2025-04-03 RX ORDER — POTASSIUM CHLORIDE ORAL 1.5 G/15ML
20 SOLUTION ORAL 2 TIMES DAILY
Qty: 2700 ML | Refills: 1 | Status: SHIPPED | OUTPATIENT
Start: 2025-04-03 | End: 2025-07-02

## 2025-04-03 NOTE — PROGRESS NOTES
Ok increase to bid and add iron daily.   Problem: PAIN - ADULT  Goal: Verbalizes/displays adequate comfort level or baseline comfort level  Description: Interventions:  - Encourage patient to monitor pain and request assistance  - Assess pain using appropriate pain scale  - Administer analgesics based on type and severity of pain and evaluate response  - Implement non-pharmacological measures as appropriate and evaluate response  - Consider cultural and social influences on pain and pain management  - Notify physician/advanced practitioner if interventions unsuccessful or patient reports new pain  Outcome: Progressing

## 2025-04-08 ENCOUNTER — TELEPHONE (OUTPATIENT)
Dept: FAMILY MEDICINE | Facility: CLINIC | Age: 35
End: 2025-04-08
Payer: MEDICARE

## 2025-04-08 NOTE — TELEPHONE ENCOUNTER
----- Message from Kaila sent at 4/8/2025 10:26 AM CDT -----  Regarding: ES  Patients mother call requesting a call back at [phone] about patients potassium and results.

## 2025-04-24 ENCOUNTER — OFFICE VISIT (OUTPATIENT)
Dept: FAMILY MEDICINE | Facility: CLINIC | Age: 35
End: 2025-04-24
Payer: MEDICARE

## 2025-04-24 VITALS
BODY MASS INDEX: 42.13 KG/M2 | RESPIRATION RATE: 20 BRPM | HEIGHT: 68 IN | SYSTOLIC BLOOD PRESSURE: 127 MMHG | TEMPERATURE: 98 F | OXYGEN SATURATION: 96 % | WEIGHT: 278 LBS | DIASTOLIC BLOOD PRESSURE: 82 MMHG | HEART RATE: 89 BPM

## 2025-04-24 DIAGNOSIS — R19.7 DIARRHEA, UNSPECIFIED TYPE: Primary | ICD-10-CM

## 2025-04-24 PROCEDURE — 99213 OFFICE O/P EST LOW 20 MIN: CPT | Mod: ,,, | Performed by: NURSE PRACTITIONER

## 2025-04-24 RX ORDER — METRONIDAZOLE 500 MG/1
500 TABLET ORAL EVERY 8 HOURS
Qty: 30 TABLET | Refills: 0 | Status: SHIPPED | OUTPATIENT
Start: 2025-04-24 | End: 2025-05-04

## 2025-04-25 ENCOUNTER — APPOINTMENT (OUTPATIENT)
Dept: RADIOLOGY | Facility: CLINIC | Age: 35
End: 2025-04-25
Attending: NURSE PRACTITIONER
Payer: MEDICARE

## 2025-04-25 ENCOUNTER — RESULTS FOLLOW-UP (OUTPATIENT)
Dept: FAMILY MEDICINE | Facility: CLINIC | Age: 35
End: 2025-04-25
Payer: MEDICARE

## 2025-04-25 ENCOUNTER — CLINICAL SUPPORT (OUTPATIENT)
Dept: FAMILY MEDICINE | Facility: CLINIC | Age: 35
End: 2025-04-25
Payer: MEDICARE

## 2025-04-25 DIAGNOSIS — R19.7 DIARRHEA, UNSPECIFIED TYPE: ICD-10-CM

## 2025-04-25 LAB
GIARDIA ANTIGEN: NEGATIVE
OCCULT BLOOD: NEGATIVE

## 2025-04-25 PROCEDURE — 87209 SMEAR COMPLEX STAIN: CPT | Mod: ,,, | Performed by: CLINICAL MEDICAL LABORATORY

## 2025-04-25 PROCEDURE — 87329 GIARDIA AG IA: CPT | Mod: ,,, | Performed by: CLINICAL MEDICAL LABORATORY

## 2025-04-25 PROCEDURE — 87449 NOS EACH ORGANISM AG IA: CPT | Mod: ,,, | Performed by: CLINICAL MEDICAL LABORATORY

## 2025-04-25 PROCEDURE — 87045 FECES CULTURE AEROBIC BACT: CPT | Mod: ,,, | Performed by: CLINICAL MEDICAL LABORATORY

## 2025-04-25 PROCEDURE — 74019 RADEX ABDOMEN 2 VIEWS: CPT | Mod: 26,,, | Performed by: RADIOLOGY

## 2025-04-25 PROCEDURE — 82272 OCCULT BLD FECES 1-3 TESTS: CPT | Mod: ,,, | Performed by: CLINICAL MEDICAL LABORATORY

## 2025-04-25 PROCEDURE — 87427 SHIGA-LIKE TOXIN AG IA: CPT | Mod: ,,, | Performed by: CLINICAL MEDICAL LABORATORY

## 2025-04-25 PROCEDURE — 87177 OVA AND PARASITES SMEARS: CPT | Mod: ,,, | Performed by: CLINICAL MEDICAL LABORATORY

## 2025-04-25 PROCEDURE — 87046 STOOL CULTR AEROBIC BACT EA: CPT | Mod: ,,, | Performed by: CLINICAL MEDICAL LABORATORY

## 2025-04-25 PROCEDURE — 87015 SPECIMEN INFECT AGNT CONCNTJ: CPT | Mod: 59,,, | Performed by: CLINICAL MEDICAL LABORATORY

## 2025-04-25 PROCEDURE — 74019 RADEX ABDOMEN 2 VIEWS: CPT | Mod: TC,RHCUB,FY | Performed by: NURSE PRACTITIONER

## 2025-04-26 LAB
CAMPYLOBACTER ANTIGEN: NEGATIVE
EHEC (SHIGA TOXIN 1): NEGATIVE
EHEC (SHIGA TOXIN 2): NEGATIVE

## 2025-04-27 LAB — SALM+SHIG+E COLI ETEC STL CULT: NORMAL

## 2025-04-30 LAB
O+P STL CONC: NORMAL
TRICHROME SMEAR: NORMAL

## 2025-06-03 RX ORDER — DICLOFENAC SODIUM 50 MG/1
TABLET, DELAYED RELEASE ORAL
Qty: 60 TABLET | Refills: 1 | Status: SHIPPED | OUTPATIENT
Start: 2025-06-03

## 2025-06-19 ENCOUNTER — OFFICE VISIT (OUTPATIENT)
Dept: FAMILY MEDICINE | Facility: CLINIC | Age: 35
End: 2025-06-19
Payer: MEDICARE

## 2025-06-19 VITALS
HEART RATE: 74 BPM | HEIGHT: 68 IN | WEIGHT: 284 LBS | SYSTOLIC BLOOD PRESSURE: 158 MMHG | DIASTOLIC BLOOD PRESSURE: 100 MMHG | TEMPERATURE: 98 F | BODY MASS INDEX: 43.04 KG/M2 | RESPIRATION RATE: 20 BRPM | OXYGEN SATURATION: 99 %

## 2025-06-19 DIAGNOSIS — R10.9 ABDOMINAL PAIN, UNSPECIFIED ABDOMINAL LOCATION: ICD-10-CM

## 2025-06-19 DIAGNOSIS — I10 ESSENTIAL HYPERTENSION, MALIGNANT: ICD-10-CM

## 2025-06-19 DIAGNOSIS — K52.9 GASTROENTERITIS: Primary | ICD-10-CM

## 2025-06-19 DIAGNOSIS — R11.0 NAUSEA: ICD-10-CM

## 2025-06-19 PROCEDURE — 99214 OFFICE O/P EST MOD 30 MIN: CPT | Mod: ,,,

## 2025-06-19 RX ORDER — DICYCLOMINE HYDROCHLORIDE 20 MG/1
20 TABLET ORAL EVERY 6 HOURS
Qty: 120 TABLET | Refills: 0 | Status: SHIPPED | OUTPATIENT
Start: 2025-06-19 | End: 2025-07-19

## 2025-06-19 RX ORDER — ONDANSETRON 4 MG/1
4 TABLET, ORALLY DISINTEGRATING ORAL EVERY 8 HOURS PRN
Qty: 10 TABLET | Refills: 0 | Status: SHIPPED | OUTPATIENT
Start: 2025-06-19

## 2025-06-19 NOTE — PROGRESS NOTES
Eugenia Ortiz NP   [address] [place] [address]     PATIENT NAME: Maldonado Levy  : 1990  DATE: 25  MRN: [id number]      Billing Provider: Eugenia Ortiz NP  Level of Service:   Patient PCP Information       Provider PCP Type    Georgia Kc DNP General            Reason for Visit / Chief Complaint: Abdominal Pain       Update PCP  Update Chief Complaint         History of Present Illness / Problem Focused Workflow     Maldonado Levy presents to the clinic with Abdominal Pain     Patient here today with mother for complaints of abdominal pain. Patient reports taking his medication this morning on an empty stomach. He usually takes medications with food. He is nauseated at this time but not vomiting. He reports eating noodles today. He also has taken Tums and pe ptobismol for his symptoms which did not ease his symptoms. He reports having a normal BM yesterday and this morining. Denies diarrhea. Advised on clear liquid diet Zofran and Bentyl sent to pharmacy. Advised to return to clinic if symptoms worsen and as needed.      BP elevated to day. He has not had BP medications today. Advised on DASH diet, daily Bp checks, and medication compliance. Take medications upon return home. Return to clinic in 1 week for Bp check.     Abdominal Pain  Associated symptoms include nausea. Pertinent negatives include no constipation, diarrhea or vomiting.     Review of Systems     Review of Systems   Constitutional: Negative.    HENT: Negative.     Eyes: Negative.    Respiratory: Negative.  Negative for cough, shortness of breath and wheezing.    Cardiovascular: Negative.  Negative for chest pain and palpitations.   Gastrointestinal:  Positive for abdominal pain and nausea. Negative for constipation, diarrhea and vomiting.   Endocrine: Negative.    Genitourinary: Negative.    Musculoskeletal: Negative.    Integumentary:  Negative.   Allergic/Immunologic: Negative.    Neurological:  "Negative.    Hematological: Negative.       Medical / Social / Family History     Past Medical History:   Diagnosis Date    Diabetes mellitus, type 2     Gout, unspecified     Hyperlipidemia     Hypertension        History reviewed. No pertinent surgical history.    Social History    reports that he has never smoked. He has never been exposed to tobacco smoke. He has never used smokeless tobacco. He reports that he does not drink alcohol and does not use drugs.    Family History  's family history includes Arthritis in his father and mother; Hypertension in his father and mother; Sleep apnea in his father.    Medications and Allergies     Medications  No outpatient medications have been marked as taking for the 6/19/25 encounter (Office Visit) with Eugenia Ortiz NP.       Allergies  Review of patient's allergies indicates:  No Known Allergies    Physical Examination   BP (!) 158/100 (BP Location: Right arm, Patient Position: Sitting) Comment: Did not take BP medcine this morning  Pulse 74   Temp 98.3 °F (36.8 °C) (Oral)   Resp 20   Ht 5' 8" (1.727 m)   Wt 128.8 kg (284 lb)   SpO2 99%   BMI 43.18 kg/m²    Physical Exam  Vitals reviewed. Exam conducted with a chaperone present.   Constitutional:       Appearance: Normal appearance. He is obese.   Eyes:      Extraocular Movements: Extraocular movements intact.      Conjunctiva/sclera: Conjunctivae normal.      Pupils: Pupils are equal, round, and reactive to light.   Cardiovascular:      Rate and Rhythm: Normal rate and regular rhythm.      Pulses: Normal pulses.      Heart sounds: Normal heart sounds.   Pulmonary:      Effort: Pulmonary effort is normal. No respiratory distress.      Breath sounds: Normal breath sounds. No wheezing or rales.   Abdominal:      General: Bowel sounds are normal.      Palpations: Abdomen is soft.      Tenderness: There is generalized abdominal tenderness. There is no guarding.   Skin:     General: Skin is warm and dry. "      Capillary Refill: Capillary refill takes less than 2 seconds.   Neurological:      General: No focal deficit present.      Mental Status: He is alert and oriented to person, place, and time.        Assessment and Plan (including Health Maintenance)      Problem List  Smart Sets  Document Outside HM   :    Plan:   Advised on clear liquid diet   Zofran and Bentyl sent to pharmacy.   Advised to return to clinic if symptoms worsen and as needed.      Advised on DASH diet, daily Bp checks, and medication compliance.   Take medications upon return home.   Return to clinic in 1 week for Bp check.         There are no preventive care reminders to display for this patient.    Problem List Items Addressed This Visit    None      Health Maintenance Topics with due status: Not Due       Topic Last Completion Date    TETANUS VACCINE 03/27/2023    Hemoglobin A1c (Prediabetes) 01/27/2025    Lipid Panel 01/27/2025    RSV Vaccine (Age 60+ and Pregnant patients) Not Due       Future Appointments   Date Time Provider Department Center   7/1/2025  1:00 PM AWV NURSE, [facility] MEDICINE [facility] LAMBERTO Cunningham   7/2/2025  9:30 AM Georgia Kc DNP [facility] LAMBERTO Cunningham            Signature:  Eugenia Ortiz NP      [address] [place] [address]    Date of encounter: 6/19/25

## 2025-06-19 NOTE — PATIENT INSTRUCTIONS
Advised on clear liquid diet   Zofran and Bentyl sent to pharmacy.   Advised to return to clinic if symptoms worsen and as needed.      Advised on DASH diet, daily Bp checks, and medication compliance.   Take medications upon return home.   Return to clinic in 1 week for Bp check.

## 2025-07-02 ENCOUNTER — OFFICE VISIT (OUTPATIENT)
Dept: FAMILY MEDICINE | Facility: CLINIC | Age: 35
End: 2025-07-02
Payer: MEDICARE

## 2025-07-02 VITALS
WEIGHT: 281 LBS | OXYGEN SATURATION: 96 % | HEIGHT: 68 IN | DIASTOLIC BLOOD PRESSURE: 81 MMHG | BODY MASS INDEX: 42.59 KG/M2 | RESPIRATION RATE: 20 BRPM | TEMPERATURE: 98 F | SYSTOLIC BLOOD PRESSURE: 125 MMHG | HEART RATE: 73 BPM

## 2025-07-02 DIAGNOSIS — R73.03 PREDIABETES: ICD-10-CM

## 2025-07-02 DIAGNOSIS — E78.49 OTHER HYPERLIPIDEMIA: ICD-10-CM

## 2025-07-02 DIAGNOSIS — E66.01 MORBID OBESITY: ICD-10-CM

## 2025-07-02 DIAGNOSIS — I10 ESSENTIAL HYPERTENSION: Primary | ICD-10-CM

## 2025-07-02 DIAGNOSIS — D64.9 ANEMIA, UNSPECIFIED TYPE: ICD-10-CM

## 2025-07-02 DIAGNOSIS — E11.9 TYPE 2 DIABETES MELLITUS WITHOUT COMPLICATION, WITHOUT LONG-TERM CURRENT USE OF INSULIN: ICD-10-CM

## 2025-07-02 DIAGNOSIS — F29 PSYCHOSIS, UNSPECIFIED PSYCHOSIS TYPE: ICD-10-CM

## 2025-07-02 DIAGNOSIS — Z79.899 OTHER LONG TERM (CURRENT) DRUG THERAPY: ICD-10-CM

## 2025-07-02 DIAGNOSIS — M10.9 GOUT, UNSPECIFIED CAUSE, UNSPECIFIED CHRONICITY, UNSPECIFIED SITE: ICD-10-CM

## 2025-07-02 PROBLEM — R06.83 SNORING: Status: RESOLVED | Noted: 2025-04-02 | Resolved: 2025-07-02

## 2025-07-02 PROBLEM — R06.89 HEAVY BREATHING: Status: RESOLVED | Noted: 2025-04-02 | Resolved: 2025-07-02

## 2025-07-02 PROBLEM — G47.8 AWAKENS FROM SLEEP AT NIGHT: Status: RESOLVED | Noted: 2025-04-02 | Resolved: 2025-07-02

## 2025-07-02 PROBLEM — R06.02 SOB (SHORTNESS OF BREATH): Status: RESOLVED | Noted: 2025-04-02 | Resolved: 2025-07-02

## 2025-07-02 LAB
ALBUMIN SERPL BCP-MCNC: 3.8 G/DL (ref 3.5–5)
ALBUMIN/GLOB SERPL: 0.8 {RATIO}
ALP SERPL-CCNC: 118 U/L (ref 40–150)
ALT SERPL W P-5'-P-CCNC: 37 U/L
ANION GAP SERPL CALCULATED.3IONS-SCNC: 10 MMOL/L (ref 7–16)
AST SERPL W P-5'-P-CCNC: 42 U/L (ref 11–45)
BASOPHILS # BLD AUTO: 0.06 K/UL (ref 0–0.2)
BASOPHILS NFR BLD AUTO: 0.7 % (ref 0–1)
BILIRUB SERPL-MCNC: 0.3 MG/DL
BUN SERPL-MCNC: 16 MG/DL (ref 9–21)
BUN/CREAT SERPL: 12 (ref 6–20)
CALCIUM SERPL-MCNC: 9.1 MG/DL (ref 8.4–10.2)
CHLORIDE SERPL-SCNC: 100 MMOL/L (ref 98–107)
CHOLEST SERPL-MCNC: 188 MG/DL
CHOLEST/HDLC SERPL: 4.8 {RATIO}
CO2 SERPL-SCNC: 33 MMOL/L (ref 22–29)
CREAT SERPL-MCNC: 1.3 MG/DL (ref 0.72–1.25)
DIFFERENTIAL METHOD BLD: ABNORMAL
EGFR (NO RACE VARIABLE) (RUSH/TITUS): 73 ML/MIN/1.73M2
EOSINOPHIL # BLD AUTO: 0.29 K/UL (ref 0–0.5)
EOSINOPHIL NFR BLD AUTO: 3.3 % (ref 1–4)
ERYTHROCYTE [DISTWIDTH] IN BLOOD BY AUTOMATED COUNT: 13.7 % (ref 11.5–14.5)
EST. AVERAGE GLUCOSE BLD GHB EST-MCNC: 120 MG/DL
GLOBULIN SER-MCNC: 4.7 G/DL (ref 2–4)
GLUCOSE SERPL-MCNC: 75 MG/DL (ref 74–100)
HBA1C MFR BLD HPLC: 5.8 %
HCT VFR BLD AUTO: 36.5 % (ref 40–54)
HDLC SERPL-MCNC: 39 MG/DL (ref 35–60)
HGB BLD-MCNC: 11.8 G/DL (ref 13.5–18)
IMM GRANULOCYTES # BLD AUTO: 0.02 K/UL (ref 0–0.04)
IMM GRANULOCYTES NFR BLD: 0.2 % (ref 0–0.4)
IRON SATN MFR SERPL: 17 % (ref 20–50)
IRON SERPL-MCNC: 42 UG/DL (ref 65–175)
LDLC SERPL CALC-MCNC: 135 MG/DL
LDLC/HDLC SERPL: 3.5 {RATIO}
LYMPHOCYTES # BLD AUTO: 2.29 K/UL (ref 1–4.8)
LYMPHOCYTES NFR BLD AUTO: 26.1 % (ref 27–41)
MCH RBC QN AUTO: 27.9 PG (ref 27–31)
MCHC RBC AUTO-ENTMCNC: 32.3 G/DL (ref 32–36)
MCV RBC AUTO: 86.3 FL (ref 80–96)
MONOCYTES # BLD AUTO: 0.77 K/UL (ref 0–0.8)
MONOCYTES NFR BLD AUTO: 8.8 % (ref 2–6)
MPC BLD CALC-MCNC: 9.4 FL (ref 9.4–12.4)
NEUTROPHILS # BLD AUTO: 5.33 K/UL (ref 1.8–7.7)
NEUTROPHILS NFR BLD AUTO: 60.9 % (ref 53–65)
NONHDLC SERPL-MCNC: 149 MG/DL
NRBC # BLD AUTO: 0 X10E3/UL
NRBC, AUTO (.00): 0 %
PLATELET # BLD AUTO: 360 K/UL (ref 150–400)
POTASSIUM SERPL-SCNC: 3.4 MMOL/L (ref 3.5–5.1)
PROT SERPL-MCNC: 8.5 G/DL (ref 6.4–8.3)
RBC # BLD AUTO: 4.23 M/UL (ref 4.6–6.2)
SODIUM SERPL-SCNC: 140 MMOL/L (ref 136–145)
TIBC SERPL-MCNC: 207 UG/DL (ref 69–240)
TIBC SERPL-MCNC: 249 UG/DL (ref 250–450)
TRANSFERRIN SERPL-MCNC: 220 MG/DL (ref 174–364)
TRIGL SERPL-MCNC: 69 MG/DL (ref 34–140)
VLDLC SERPL-MCNC: 14 MG/DL
WBC # BLD AUTO: 8.76 K/UL (ref 4.5–11)

## 2025-07-02 PROCEDURE — 83036 HEMOGLOBIN GLYCOSYLATED A1C: CPT | Mod: ,,, | Performed by: CLINICAL MEDICAL LABORATORY

## 2025-07-02 PROCEDURE — 85025 COMPLETE CBC W/AUTO DIFF WBC: CPT | Mod: ,,, | Performed by: CLINICAL MEDICAL LABORATORY

## 2025-07-02 PROCEDURE — 83540 ASSAY OF IRON: CPT | Mod: ,,, | Performed by: CLINICAL MEDICAL LABORATORY

## 2025-07-02 PROCEDURE — 80061 LIPID PANEL: CPT | Mod: ,,, | Performed by: CLINICAL MEDICAL LABORATORY

## 2025-07-02 PROCEDURE — 80053 COMPREHEN METABOLIC PANEL: CPT | Mod: ,,, | Performed by: CLINICAL MEDICAL LABORATORY

## 2025-07-02 PROCEDURE — 83550 IRON BINDING TEST: CPT | Mod: ,,, | Performed by: CLINICAL MEDICAL LABORATORY

## 2025-07-02 NOTE — PROGRESS NOTES
"   Georgia Kc DNP   [address] [place] [address]     PATIENT NAME: Maldonado Levy  : 1990  DATE: 25  MRN: [id number]      Billing Provider: Georgia Kc DNP  Level of Service: NV OFFICE/OUTPT VISIT, EST, LEVL IV, 30-39 MIN  Patient PCP Information       Provider PCP Type    Georgia Kc DNP General            Reason for Visit / Chief Complaint: Hypertension, Hyperlipidemia, Health Maintenance (There are no preventive care reminders to display for this patient./), and Diabetes       Update PCP  Update Chief Complaint         History of Present Illness / Problem Focused Workflow     Maldonado Levy presents to the clinic with Hypertension, Hyperlipidemia, Health Maintenance (There are no preventive care reminders to display for this patient./), and Diabetes     HPI    Review of Systems     Review of Systems     Medical / Social / Family History     Past Medical History:   Diagnosis Date    Diabetes mellitus, type 2     Gout, unspecified     Hyperlipidemia     Hypertension        History reviewed. No pertinent surgical history.    Social History    reports that he has never smoked. He has never been exposed to tobacco smoke. He has never used smokeless tobacco. He reports that he does not drink alcohol and does not use drugs.    Family History  's family history includes Arthritis in his father and mother; Hypertension in his father and mother; Sleep apnea in his father.    Medications and Allergies     Medications  No outpatient medications have been marked as taking for the 25 encounter (Office Visit) with Georgia Kc DNP.       Allergies  Review of patient's allergies indicates:  No Known Allergies    Physical Examination   /81 (BP Location: Left arm, Patient Position: Sitting)   Pulse 73   Temp 98.2 °F (36.8 °C) (Oral)   Resp 20   Ht 5' 8" (1.727 m)   Wt 127.5 kg (281 lb)   SpO2 96%   BMI 42.73 kg/m²    Physical Exam  Vitals and nursing " note reviewed.   Constitutional:       Appearance: Normal appearance. He is obese.   HENT:      Head: Normocephalic and atraumatic.      Nose: Nose normal.      Mouth/Throat:      Mouth: Mucous membranes are moist.   Eyes:      Pupils: Pupils are equal, round, and reactive to light.   Cardiovascular:      Rate and Rhythm: Normal rate and regular rhythm.      Pulses: Normal pulses.      Heart sounds: Normal heart sounds.   Pulmonary:      Effort: Pulmonary effort is normal.      Breath sounds: Normal breath sounds.   Abdominal:      General: Abdomen is flat. Bowel sounds are normal.      Palpations: Abdomen is soft.   Musculoskeletal:         General: Normal range of motion.      Cervical back: Normal range of motion and neck supple.   Skin:     General: Skin is warm and dry.      Capillary Refill: Capillary refill takes less than 2 seconds.   Neurological:      General: No focal deficit present.      Mental Status: He is alert and oriented to person, place, and time. Mental status is at baseline.   Psychiatric:         Attention and Perception: Attention normal.         Mood and Affect: Mood normal.         Speech: Speech normal.         Behavior: Behavior normal. Behavior is cooperative.         Thought Content: Thought content normal.         Cognition and Memory: Cognition is impaired.         Judgment: Judgment normal.        Assessment and Plan (including Health Maintenance)      Problem List  Smart Sets  Document Outside HM   :    Plan:   Continue current meds.. Labs today. Encouraged diet/exercise     Keep f/u with [facility]. q3 months- last seen June 2025  bed wetting q2 months now better. - using pullups at bedtime occasionally.   otc fiber supplements for constipation  kenzie'[place] in 11/2018.      Watch htn. Wgt up. Work on diet/exercise. goal 20lbs weight loss. 3lb loss since June      refill meds.  flu - 2024   covid vaccine - 2021 J&J      See back 3 months .   Diabetic foot exam +2 pulses, dry and callused  skin. Sees dr brown in [facility]  as needed  Send to [facility] for diabetic shoe eval. Never got - 2020 [place] had to pay $139 for them-- not wanting to go anytime soon      optho -12/2024 [facility]   Pcv - 12/2022   refill meds       There are no preventive care reminders to display for this patient.    Problem List Items Addressed This Visit          Psychiatric    Psychosis       Cardiac/Vascular    Essential hypertension - Primary    Relevant Orders    CBC Auto Differential    Comprehensive Metabolic Panel    Other hyperlipidemia    Relevant Orders    Lipid Panel       Oncology    Anemia    Relevant Orders    Iron and TIBC       Endocrine    BMI 40.0-44.9, adult    Morbid obesity    Prediabetes    Relevant Orders    Hemoglobin A1C    Type 2 diabetes mellitus without complication, without long-term current use of insulin       Orthopedic    Gout       Palliative Care    Other long term (current) drug therapy       Health Maintenance Topics with due status: Not Due       Topic Last Completion Date    TETANUS VACCINE 03/27/2023    Influenza Vaccine 09/25/2024    Hemoglobin A1c (Prediabetes) 01/27/2025    Lipid Panel 01/27/2025    RSV Vaccine (Age 60+ and Pregnant patients) Not Due       Future Appointments   Date Time Provider Department Center   10/6/2025 10:15 AM Georgia Kc DNP [facility]   4/21/2026  2:00 PM AWV NURSE, [facility]            Signature:  Georgia Kc DNP      [address] [place] [address]    Date of encounter: 7/2/25

## 2025-07-03 ENCOUNTER — RESULTS FOLLOW-UP (OUTPATIENT)
Dept: FAMILY MEDICINE | Facility: CLINIC | Age: 35
End: 2025-07-03

## 2025-07-14 DIAGNOSIS — G47.30 SLEEP APNEA, UNSPECIFIED: Primary | ICD-10-CM

## 2025-07-18 NOTE — PROGRESS NOTES
MELECIO Henley   [facility] [address] [place] [address]  [phone]      PATIENT NAME: Maldonado Levy  : 1990  DATE: 25  MRN: [id number]      Billing Provider: MELECIO Henley  Level of Service: [place] OFFICE/OUTPT VISIT, EST, LEVL III, 20-29 MIN  Patient PCP Information       Provider PCP Type    Georgia Kc DNP General            Reason for Visit / Chief Complaint: Abdominal Pain and Diarrhea    History of Present Illness / Problem Focused Workflow     Maldonado Levy presents to the clinic with Abdominal Pain and Diarrhea     Abdominal Pain  This is a new problem. The current episode started in the past 7 days. The onset quality is gradual. The problem occurs intermittently. The problem has been waxing and waning. The pain is located in the generalized abdominal region. The pain is moderate. The quality of the pain is cramping. The abdominal pain does not radiate. Associated symptoms include diarrhea and flatus. Pertinent negatives include no anorexia, fever, nausea or vomiting. Nothing aggravates the pain. The pain is relieved by Nothing. There is no history of irritable bowel syndrome.   Diarrhea   This is a new problem. The current episode started in the past 7 days. The problem occurs 2 to 4 times per day. The problem has been unchanged. The stool consistency is described as Watery. The patient states that diarrhea does not awaken him from sleep. Associated symptoms include abdominal pain and increased flatus. Pertinent negatives include no coughing, fever or vomiting. Nothing aggravates the symptoms. There are no known risk factors. He has tried nothing for the symptoms. There is no history of bowel resection, irritable bowel syndrome or malabsorption.       Review of Systems     Review of Systems   Constitutional:  Positive for appetite change. Negative for fever.   Respiratory:   Negative for cough and shortness of breath.    Gastrointestinal:  Positive for abdominal pain, diarrhea and flatus. Negative for abdominal distention, anorexia, blood in stool, nausea and vomiting.   Genitourinary:  Negative for decreased urine volume, difficulty urinating and flank pain.   Allergic/Immunologic: Negative for food allergies.   Neurological:  Negative for dizziness and light-headedness.   Psychiatric/Behavioral:  Negative for behavioral problems and sleep disturbance.        Medical / Social / Family History     Past Medical History:   Diagnosis Date    Diabetes mellitus, type 2     Gout, unspecified     Hyperlipidemia     Hypertension        History reviewed. No pertinent surgical history.    Social History    reports that he has never smoked. He has never been exposed to tobacco smoke. He has never used smokeless tobacco. He reports that he does not drink alcohol and does not use drugs.    Family History  's family history includes Arthritis in his father and mother; Hypertension in his father and mother; Sleep apnea in his father.       Health Maintenance  Health Maintenance Due   Topic Date Due    Diabetes Urine Screening  12/19/2023    Foot Exam  09/20/2024     Health Maintenance Topics with due status: Not Due       Topic Last Completion Date    TETANUS VACCINE 03/27/2023    Influenza Vaccine 09/25/2024    Diabetic Eye Exam 12/10/2024    Lipid Panel 07/02/2025    Hemoglobin A1c 07/02/2025    RSV Vaccine (Age 60+ and Pregnant patients) Not Due       Medications and Allergies     Medications  No outpatient medications have been marked as taking for the 4/24/25 encounter (Office Visit) with Diya Corey FNP.       Allergies  Review of patient's allergies indicates:  No Known Allergies    Physical Examination     Vitals:    04/24/25 1548   BP: 127/82   Pulse: 89   Resp: 20   Temp: 98.1 °F (36.7 °C)     Physical Exam  Constitutional:       Appearance: Normal appearance.   HENT:       Head: Normocephalic.      Right Ear: Tympanic membrane normal.      Left Ear: Tympanic membrane normal.      Nose: Nose normal.      Mouth/Throat:      Mouth: Mucous membranes are moist.   Cardiovascular:      Rate and Rhythm: Normal rate and regular rhythm.      Heart sounds: Normal heart sounds.   Pulmonary:      Effort: Pulmonary effort is normal. No respiratory distress.      Breath sounds: Normal breath sounds. No wheezing, rhonchi or rales.   Abdominal:      General: Abdomen is flat. There is no distension.      Palpations: Abdomen is soft. There is no mass.      Tenderness: There is abdominal tenderness.      Hernia: No hernia is present.   Musculoskeletal:         General: Normal range of motion.   Skin:     General: Skin is warm and dry.   Neurological:      General: No focal deficit present.      Mental Status: He is alert and oriented to person, place, and time.         Assessment and Plan     :1. Diarrhea, unspecified type    -     metroNIDAZOLE (FLAGYL) 500 MG tablet; Take 1 tablet (500 mg total) by mouth every 8 (eight) hours. for 10 days  Dispense: 30 tablet; Refill: 0  -     X-Ray Abdomen Flat And Erect; Future; Expected date: 04/25/2025  -     Occult blood x 1, stool; Future; Expected date: 04/25/2025  -     Stool culture; Future; Expected date: 04/25/2025  -     Ova and Parasite Exam; Future; Expected date: 04/25/2025  -     Giardia antigen; Future; Expected date: 04/25/2025  -     Stool Antigen Test    Await lab and xr  RTC if no improvement in s/s or if s/s worsen despite treatment         Future Appointments   Date Time Provider Department Center   7/23/2025  7:30 PM SLEEP LAB, [facility] TANJA SLEEP [facility]   10/6/2025 10:15 AM Georgia Kc DNP [facility]   4/21/2026  2:00 PM AWV NURSE, [facility] FAMILY MEDICINE [facility] L[facility]ERT[facility]            Signature:  Diya Corey, MELECIO  [place] [facility]  [address] [address]  [phone]    Date of encounter: 4/24/25

## 2025-07-23 ENCOUNTER — PROCEDURE VISIT (OUTPATIENT)
Dept: SLEEP MEDICINE | Facility: HOSPITAL | Age: 35
End: 2025-07-23
Payer: MEDICARE

## 2025-07-23 DIAGNOSIS — G47.30 SLEEP APNEA, UNSPECIFIED: ICD-10-CM

## 2025-07-23 PROCEDURE — 95811 POLYSOM 6/>YRS CPAP 4/> PARM: CPT | Mod: PO

## 2025-07-25 DIAGNOSIS — G47.33 OBSTRUCTIVE SLEEP APNEA (ADULT) (PEDIATRIC): Primary | ICD-10-CM

## 2025-07-28 ENCOUNTER — PROCEDURE VISIT (OUTPATIENT)
Dept: SLEEP MEDICINE | Facility: HOSPITAL | Age: 35
End: 2025-07-28
Attending: INTERNAL MEDICINE
Payer: MEDICARE

## 2025-07-28 DIAGNOSIS — G47.33 OBSTRUCTIVE SLEEP APNEA (ADULT) (PEDIATRIC): ICD-10-CM

## 2025-07-28 PROCEDURE — 95811 POLYSOM 6/>YRS CPAP 4/> PARM: CPT | Mod: PO

## 2025-07-29 RX ORDER — POTASSIUM CHLORIDE ORAL 1.5 G/15ML
20 SOLUTION ORAL DAILY
Qty: 1350 ML | Refills: 1 | Status: SHIPPED | OUTPATIENT
Start: 2025-07-29 | End: 2025-10-27

## 2025-08-06 ENCOUNTER — CLINICAL SUPPORT (OUTPATIENT)
Dept: FAMILY MEDICINE | Facility: CLINIC | Age: 35
End: 2025-08-06
Payer: MEDICARE

## 2025-08-06 DIAGNOSIS — E61.1 LOW IRON: Primary | ICD-10-CM

## 2025-08-06 LAB
IRON SATN MFR SERPL: 22 % (ref 20–50)
IRON SERPL-MCNC: 53 UG/DL (ref 65–175)
TIBC SERPL-MCNC: 185 UG/DL (ref 69–240)
TIBC SERPL-MCNC: 238 UG/DL (ref 250–450)
TRANSFERRIN SERPL-MCNC: 224 MG/DL (ref 174–364)

## 2025-08-06 PROCEDURE — 83550 IRON BINDING TEST: CPT | Mod: ,,, | Performed by: CLINICAL MEDICAL LABORATORY

## 2025-08-06 PROCEDURE — 83540 ASSAY OF IRON: CPT | Mod: ,,, | Performed by: CLINICAL MEDICAL LABORATORY

## 2025-08-06 PROCEDURE — 99499 UNLISTED E&M SERVICE: CPT | Mod: ,,, | Performed by: NURSE PRACTITIONER

## 2025-08-21 RX ORDER — DICLOFENAC SODIUM 50 MG/1
TABLET, DELAYED RELEASE ORAL
Qty: 60 TABLET | Refills: 1 | Status: SHIPPED | OUTPATIENT
Start: 2025-08-21